# Patient Record
Sex: FEMALE | Race: OTHER | HISPANIC OR LATINO
[De-identification: names, ages, dates, MRNs, and addresses within clinical notes are randomized per-mention and may not be internally consistent; named-entity substitution may affect disease eponyms.]

---

## 2017-07-26 ENCOUNTER — APPOINTMENT (OUTPATIENT)
Dept: ANTEPARTUM | Facility: CLINIC | Age: 37
End: 2017-07-26

## 2017-07-26 ENCOUNTER — LABORATORY RESULT (OUTPATIENT)
Age: 37
End: 2017-07-26

## 2017-07-26 ENCOUNTER — ASOB RESULT (OUTPATIENT)
Age: 37
End: 2017-07-26

## 2017-09-15 ENCOUNTER — APPOINTMENT (OUTPATIENT)
Dept: ANTEPARTUM | Facility: CLINIC | Age: 37
End: 2017-09-15
Payer: COMMERCIAL

## 2017-09-15 ENCOUNTER — ASOB RESULT (OUTPATIENT)
Age: 37
End: 2017-09-15

## 2017-09-15 PROCEDURE — 76811 OB US DETAILED SNGL FETUS: CPT

## 2018-01-08 ENCOUNTER — ASOB RESULT (OUTPATIENT)
Age: 38
End: 2018-01-08

## 2018-01-08 ENCOUNTER — APPOINTMENT (OUTPATIENT)
Dept: ANTEPARTUM | Facility: CLINIC | Age: 38
End: 2018-01-08
Payer: COMMERCIAL

## 2018-01-08 PROCEDURE — 76816 OB US FOLLOW-UP PER FETUS: CPT

## 2018-01-26 ENCOUNTER — INPATIENT (INPATIENT)
Facility: HOSPITAL | Age: 38
LOS: 1 days | Discharge: HOME | End: 2018-01-28
Attending: OBSTETRICS & GYNECOLOGY | Admitting: OBSTETRICS & GYNECOLOGY
Payer: COMMERCIAL

## 2018-01-26 VITALS
TEMPERATURE: 99 F | SYSTOLIC BLOOD PRESSURE: 135 MMHG | OXYGEN SATURATION: 100 % | DIASTOLIC BLOOD PRESSURE: 65 MMHG | HEART RATE: 89 BPM | RESPIRATION RATE: 15 BRPM

## 2018-01-26 DIAGNOSIS — Z34.80 ENCOUNTER FOR SUPERVISION OF OTHER NORMAL PREGNANCY, UNSPECIFIED TRIMESTER: ICD-10-CM

## 2018-01-26 DIAGNOSIS — Z3A.00 WEEKS OF GESTATION OF PREGNANCY NOT SPECIFIED: ICD-10-CM

## 2018-01-26 DIAGNOSIS — O26.899 OTHER SPECIFIED PREGNANCY RELATED CONDITIONS, UNSPECIFIED TRIMESTER: ICD-10-CM

## 2018-01-26 LAB
BASOPHILS # BLD AUTO: 0.1 K/UL — SIGNIFICANT CHANGE UP (ref 0–0.2)
BASOPHILS NFR BLD AUTO: 0.4 % — SIGNIFICANT CHANGE UP (ref 0–2)
BLD GP AB SCN SERPL QL: NEGATIVE — SIGNIFICANT CHANGE UP
EOSINOPHIL # BLD AUTO: 0.1 K/UL — SIGNIFICANT CHANGE UP (ref 0–0.5)
EOSINOPHIL NFR BLD AUTO: 0.7 % — SIGNIFICANT CHANGE UP (ref 0–6)
HCT VFR BLD CALC: 28.9 % — LOW (ref 34.5–45)
HGB BLD-MCNC: 10.1 G/DL — LOW (ref 11.5–15.5)
LYMPHOCYTES # BLD AUTO: 22.8 % — SIGNIFICANT CHANGE UP (ref 13–44)
LYMPHOCYTES # BLD AUTO: 3.7 K/UL — HIGH (ref 1–3.3)
MCHC RBC-ENTMCNC: 26.7 PG — LOW (ref 27–34)
MCHC RBC-ENTMCNC: 34.9 GM/DL — SIGNIFICANT CHANGE UP (ref 32–36)
MCV RBC AUTO: 76.7 FL — LOW (ref 80–100)
MONOCYTES # BLD AUTO: 1.2 K/UL — HIGH (ref 0–0.9)
MONOCYTES NFR BLD AUTO: 7.6 % — SIGNIFICANT CHANGE UP (ref 2–14)
NEUTROPHILS # BLD AUTO: 11.2 K/UL — HIGH (ref 1.8–7.4)
NEUTROPHILS NFR BLD AUTO: 68.5 % — SIGNIFICANT CHANGE UP (ref 43–77)
PLATELET # BLD AUTO: 257 K/UL — SIGNIFICANT CHANGE UP (ref 150–400)
RBC # BLD: 3.77 M/UL — LOW (ref 3.8–5.2)
RBC # FLD: 15.6 % — HIGH (ref 10.3–14.5)
RH IG SCN BLD-IMP: POSITIVE — SIGNIFICANT CHANGE UP
T PALLIDUM AB TITR SER: NEGATIVE — SIGNIFICANT CHANGE UP
WBC # BLD: 16.4 K/UL — HIGH (ref 3.8–10.5)
WBC # FLD AUTO: 16.4 K/UL — HIGH (ref 3.8–10.5)

## 2018-01-26 RX ORDER — ACETAMINOPHEN 500 MG
975 TABLET ORAL EVERY 6 HOURS
Qty: 0 | Refills: 0 | Status: DISCONTINUED | OUTPATIENT
Start: 2018-01-26 | End: 2018-01-28

## 2018-01-26 RX ORDER — DIPHENHYDRAMINE HCL 50 MG
25 CAPSULE ORAL EVERY 6 HOURS
Qty: 0 | Refills: 0 | Status: DISCONTINUED | OUTPATIENT
Start: 2018-01-26 | End: 2018-01-28

## 2018-01-26 RX ORDER — SODIUM CHLORIDE 9 MG/ML
3 INJECTION INTRAMUSCULAR; INTRAVENOUS; SUBCUTANEOUS EVERY 8 HOURS
Qty: 0 | Refills: 0 | Status: DISCONTINUED | OUTPATIENT
Start: 2018-01-26 | End: 2018-01-28

## 2018-01-26 RX ORDER — MAGNESIUM HYDROXIDE 400 MG/1
30 TABLET, CHEWABLE ORAL
Qty: 0 | Refills: 0 | Status: DISCONTINUED | OUTPATIENT
Start: 2018-01-26 | End: 2018-01-28

## 2018-01-26 RX ORDER — HYDROCORTISONE 1 %
1 OINTMENT (GRAM) TOPICAL EVERY 4 HOURS
Qty: 0 | Refills: 0 | Status: DISCONTINUED | OUTPATIENT
Start: 2018-01-26 | End: 2018-01-28

## 2018-01-26 RX ORDER — SODIUM CHLORIDE 9 MG/ML
3 INJECTION INTRAMUSCULAR; INTRAVENOUS; SUBCUTANEOUS EVERY 8 HOURS
Qty: 0 | Refills: 0 | Status: DISCONTINUED | OUTPATIENT
Start: 2018-01-26 | End: 2018-01-26

## 2018-01-26 RX ORDER — PRAMOXINE HYDROCHLORIDE 150 MG/15G
1 AEROSOL, FOAM RECTAL EVERY 4 HOURS
Qty: 0 | Refills: 0 | Status: DISCONTINUED | OUTPATIENT
Start: 2018-01-26 | End: 2018-01-28

## 2018-01-26 RX ORDER — AER TRAVELER 0.5 G/1
1 SOLUTION RECTAL; TOPICAL EVERY 4 HOURS
Qty: 0 | Refills: 0 | Status: DISCONTINUED | OUTPATIENT
Start: 2018-01-26 | End: 2018-01-28

## 2018-01-26 RX ORDER — SIMETHICONE 80 MG/1
80 TABLET, CHEWABLE ORAL EVERY 6 HOURS
Qty: 0 | Refills: 0 | Status: DISCONTINUED | OUTPATIENT
Start: 2018-01-26 | End: 2018-01-28

## 2018-01-26 RX ORDER — DIBUCAINE 1 %
1 OINTMENT (GRAM) RECTAL EVERY 4 HOURS
Qty: 0 | Refills: 0 | Status: DISCONTINUED | OUTPATIENT
Start: 2018-01-26 | End: 2018-01-26

## 2018-01-26 RX ORDER — IBUPROFEN 200 MG
600 TABLET ORAL EVERY 6 HOURS
Qty: 0 | Refills: 0 | Status: DISCONTINUED | OUTPATIENT
Start: 2018-01-26 | End: 2018-01-28

## 2018-01-26 RX ORDER — SODIUM CHLORIDE 9 MG/ML
1000 INJECTION, SOLUTION INTRAVENOUS ONCE
Qty: 0 | Refills: 0 | Status: DISCONTINUED | OUTPATIENT
Start: 2018-01-26 | End: 2018-01-26

## 2018-01-26 RX ORDER — OXYCODONE HYDROCHLORIDE 5 MG/1
5 TABLET ORAL EVERY 4 HOURS
Qty: 0 | Refills: 0 | Status: DISCONTINUED | OUTPATIENT
Start: 2018-01-26 | End: 2018-01-28

## 2018-01-26 RX ORDER — IBUPROFEN 200 MG
600 TABLET ORAL EVERY 6 HOURS
Qty: 0 | Refills: 0 | Status: COMPLETED | OUTPATIENT
Start: 2018-01-26 | End: 2018-12-25

## 2018-01-26 RX ORDER — OXYTOCIN 10 UNIT/ML
41.67 VIAL (ML) INJECTION
Qty: 20 | Refills: 0 | Status: DISCONTINUED | OUTPATIENT
Start: 2018-01-26 | End: 2018-01-26

## 2018-01-26 RX ORDER — HYDROCORTISONE 1 %
1 OINTMENT (GRAM) TOPICAL EVERY 4 HOURS
Qty: 0 | Refills: 0 | Status: DISCONTINUED | OUTPATIENT
Start: 2018-01-26 | End: 2018-01-26

## 2018-01-26 RX ORDER — OXYCODONE HYDROCHLORIDE 5 MG/1
5 TABLET ORAL
Qty: 0 | Refills: 0 | Status: DISCONTINUED | OUTPATIENT
Start: 2018-01-26 | End: 2018-01-28

## 2018-01-26 RX ORDER — SODIUM CHLORIDE 9 MG/ML
1000 INJECTION, SOLUTION INTRAVENOUS
Qty: 0 | Refills: 0 | Status: DISCONTINUED | OUTPATIENT
Start: 2018-01-26 | End: 2018-01-26

## 2018-01-26 RX ORDER — DOCUSATE SODIUM 100 MG
100 CAPSULE ORAL
Qty: 0 | Refills: 0 | Status: DISCONTINUED | OUTPATIENT
Start: 2018-01-26 | End: 2018-01-28

## 2018-01-26 RX ORDER — DIBUCAINE 1 %
1 OINTMENT (GRAM) RECTAL EVERY 4 HOURS
Qty: 0 | Refills: 0 | Status: DISCONTINUED | OUTPATIENT
Start: 2018-01-26 | End: 2018-01-28

## 2018-01-26 RX ORDER — ACETAMINOPHEN 500 MG
975 TABLET ORAL EVERY 6 HOURS
Qty: 0 | Refills: 0 | Status: COMPLETED | OUTPATIENT
Start: 2018-01-26 | End: 2018-12-25

## 2018-01-26 RX ORDER — GLYCERIN ADULT
1 SUPPOSITORY, RECTAL RECTAL AT BEDTIME
Qty: 0 | Refills: 0 | Status: DISCONTINUED | OUTPATIENT
Start: 2018-01-26 | End: 2018-01-28

## 2018-01-26 RX ORDER — AER TRAVELER 0.5 G/1
1 SOLUTION RECTAL; TOPICAL EVERY 4 HOURS
Qty: 0 | Refills: 0 | Status: DISCONTINUED | OUTPATIENT
Start: 2018-01-26 | End: 2018-01-26

## 2018-01-26 RX ORDER — OXYTOCIN 10 UNIT/ML
333.33 VIAL (ML) INJECTION
Qty: 20 | Refills: 0 | Status: DISCONTINUED | OUTPATIENT
Start: 2018-01-26 | End: 2018-01-26

## 2018-01-26 RX ORDER — LANOLIN
1 OINTMENT (GRAM) TOPICAL EVERY 6 HOURS
Qty: 0 | Refills: 0 | Status: DISCONTINUED | OUTPATIENT
Start: 2018-01-26 | End: 2018-01-28

## 2018-01-26 RX ORDER — PRAMOXINE HYDROCHLORIDE 150 MG/15G
1 AEROSOL, FOAM RECTAL EVERY 4 HOURS
Qty: 0 | Refills: 0 | Status: DISCONTINUED | OUTPATIENT
Start: 2018-01-26 | End: 2018-01-26

## 2018-01-26 RX ORDER — CITRIC ACID/SODIUM CITRATE 300-500 MG
15 SOLUTION, ORAL ORAL EVERY 4 HOURS
Qty: 0 | Refills: 0 | Status: DISCONTINUED | OUTPATIENT
Start: 2018-01-26 | End: 2018-01-26

## 2018-01-26 RX ADMIN — SODIUM CHLORIDE 250 MILLILITER(S): 9 INJECTION, SOLUTION INTRAVENOUS at 11:01

## 2018-01-26 RX ADMIN — Medication 600 MILLIGRAM(S): at 21:14

## 2018-01-26 RX ADMIN — Medication 600 MILLIGRAM(S): at 21:50

## 2018-01-26 RX ADMIN — Medication 15 MILLILITER(S): at 11:23

## 2018-01-26 NOTE — PATIENT PROFILE OB - PRO FEEDING PLAN INFANT OB
[facility], [place] [address] [facility], [address] [address] [phone]               Thank you for choosing us for your health care visit with Xiomara Lyon MD.  We are glad to serve you and happy to provide you with this Assoc Dx:  CKD (chronic kidney disease), stage 4 (severe) (HCC) [N18.4], Anemia in chronic kidney disease [N18.9, D63.1], BEN (acute kidney injury) ([facility] [address] [V25.8], Metabolic acidosis [C45.6]                 Follow-up Instructions     Return in about 3 lois Summaries. If you've been to the Emergency Department or your doctor's office, you can view your past visit information in [employer] by going to Visits < Visit Summaries. [employer] questions? Call [phone] for help.   [employer] is NOT to be used for urge breastfeeding exclusively

## 2018-01-27 ENCOUNTER — TRANSCRIPTION ENCOUNTER (OUTPATIENT)
Age: 38
End: 2018-01-27

## 2018-01-27 LAB
HCT VFR BLD CALC: 27 % — LOW (ref 34.5–45)
HGB BLD-MCNC: 9.2 G/DL — LOW (ref 11.5–15.5)

## 2018-01-27 RX ORDER — IBUPROFEN 200 MG
1 TABLET ORAL
Qty: 0 | Refills: 0 | DISCHARGE
Start: 2018-01-27

## 2018-01-27 RX ORDER — FERROUS SULFATE 325(65) MG
325 TABLET ORAL
Qty: 0 | Refills: 0 | Status: DISCONTINUED | OUTPATIENT
Start: 2018-01-27 | End: 2018-01-28

## 2018-01-27 RX ORDER — DOCUSATE SODIUM 100 MG
1 CAPSULE ORAL
Qty: 0 | Refills: 0 | DISCHARGE
Start: 2018-01-27

## 2018-01-27 RX ORDER — ASCORBIC ACID 60 MG
1 TABLET,CHEWABLE ORAL
Qty: 0 | Refills: 0 | DISCHARGE
Start: 2018-01-27

## 2018-01-27 RX ORDER — LANOLIN
1 OINTMENT (GRAM) TOPICAL
Qty: 0 | Refills: 0 | DISCHARGE
Start: 2018-01-27

## 2018-01-27 RX ORDER — ACETAMINOPHEN 500 MG
3 TABLET ORAL
Qty: 0 | Refills: 0 | DISCHARGE
Start: 2018-01-27

## 2018-01-27 RX ORDER — DIBUCAINE 1 %
1 OINTMENT (GRAM) RECTAL
Qty: 0 | Refills: 0 | DISCHARGE
Start: 2018-01-27

## 2018-01-27 RX ORDER — ASCORBIC ACID 60 MG
250 TABLET,CHEWABLE ORAL THREE TIMES A DAY
Qty: 0 | Refills: 0 | Status: DISCONTINUED | OUTPATIENT
Start: 2018-01-27 | End: 2018-01-28

## 2018-01-27 RX ADMIN — Medication 325 MILLIGRAM(S): at 18:16

## 2018-01-27 RX ADMIN — Medication 250 MILLIGRAM(S): at 12:37

## 2018-01-27 RX ADMIN — Medication 250 MILLIGRAM(S): at 21:52

## 2018-01-27 RX ADMIN — Medication 325 MILLIGRAM(S): at 12:38

## 2018-01-27 RX ADMIN — Medication 600 MILLIGRAM(S): at 19:10

## 2018-01-27 RX ADMIN — Medication 600 MILLIGRAM(S): at 05:41

## 2018-01-27 RX ADMIN — Medication 600 MILLIGRAM(S): at 13:30

## 2018-01-27 RX ADMIN — Medication 1 TABLET(S): at 12:37

## 2018-01-27 RX ADMIN — Medication 600 MILLIGRAM(S): at 18:16

## 2018-01-27 RX ADMIN — Medication 600 MILLIGRAM(S): at 06:10

## 2018-01-27 RX ADMIN — Medication 600 MILLIGRAM(S): at 12:38

## 2018-01-27 RX ADMIN — Medication 975 MILLIGRAM(S): at 21:00

## 2018-01-27 RX ADMIN — Medication 975 MILLIGRAM(S): at 20:18

## 2018-01-27 RX ADMIN — Medication 100 MILLIGRAM(S): at 12:38

## 2018-01-27 NOTE — DISCHARGE NOTE OB - MEDICATION SUMMARY - MEDICATIONS TO TAKE
I will START or STAY ON the medications listed below when I get home from the hospital:    acetaminophen 325 mg oral tablet  -- 3 tab(s) by mouth every 6 hours  -- Indication: For Pain    ibuprofen 600 mg oral tablet  -- 1 tab(s) by mouth every 6 hours  -- Indication: For Pain    dibucaine 1% topical ointment  -- 1 application on skin every 4 hours, As needed, Perineal Discomfort  -- Indication: For Pain    lanolin topical ointment  -- 1 application on skin every 6 hours, As needed, Sore Nipples  -- Indication: For Breast care    Prenatal Multivitamins with Folic Acid 1 mg oral tablet  -- 1 tab(s) by mouth once a day  -- Indication: For Health    docusate sodium 100 mg oral capsule  -- 1 cap(s) by mouth 2 times a day, As needed, Stool Softening  -- Indication: For Stool softener    ascorbic acid 250 mg oral tablet  -- 1 tab(s) by mouth 3 times a day  -- Indication: For anemia

## 2018-01-27 NOTE — PROGRESS NOTE ADULT - SUBJECTIVE AND OBJECTIVE BOX
Postpartum Note- PPD#1    Allergies    No Known Allergies    Intolerances      Blood Type: O   Positive    RPR : Negative    Rubella:  Immune      S: Patient is a  38yo P2  PPD#1         S/P    Patient w/o complaints, pain is controlled.    Pt is OOB, tolerating PO, passing flatus. Lochia WNL.     Feeding: Breast    O:  Vital Signs Last 24 Hrs  T(C): 36.6 (2018 06:00), Max: 37.3 (2018 13:15)  T(F): 97.9 (2018 06:00), Max: 99.1 (2018 13:15)  HR: 72 (2018 06:00) (72 - 98)  BP: 120/78 (2018 06:00) (116/74 - 135/65)  RR: 18 (2018 06:00) (15 - 18)  SpO2: 100% (2018 17:10) (98% - 100%)     Gen: NAD  Abdomen: Soft, nontender, non-distended, fundus firm.  Vaginal: Lochia WNL  Ext: Neg edema, Neg calf tenderness    LABS:    Hemoglobin: 9.2 g/dL ( @ 07:09)  Hemoglobin: 10.1 g/dL ( @ 09:35)      Hematocrit: 27.0 % ( @ 07:09)  Hematocrit: 28.9 % ( @ 09:35)      PMHx: none  Current Issues: none

## 2018-01-27 NOTE — DISCHARGE NOTE OB - CARE PROVIDER_API CALL
Shilpa Best), Obstetrics and Gynecology  [address] [place] [address]  Phone: [phone]  Fax: [phone]

## 2018-01-27 NOTE — DISCHARGE NOTE OB - HOSPITAL COURSE
Pt underwent normal vaginal delivery. Uncomplicated postpartum course. Pt met all milestones in regards to postop labs, activities, diet and pain control. Pt was discharged on PPD #2 in stable condition. All follow up and instructions discussed.

## 2018-01-27 NOTE — DISCHARGE NOTE OB - PATIENT PORTAL LINK FT
“You can access the FollowHealth Patient Portal, offered by [facility], by registering with the following website: [email or web address]”

## 2018-01-27 NOTE — DISCHARGE NOTE OB - CARE PLAN
Principal Discharge DX:	Vaginal delivery  Goal:	Recovery  Assessment and plan of treatment:	See below.

## 2018-01-27 NOTE — CHART NOTE - NSCHARTNOTEFT_GEN_A_CORE
PA NOTE     Day 1        Vital Signs Last 24 Hrs  T(C): 36.6 (2018 06:00), Max: 37.3 (2018 13:15)  T(F): 97.9 (2018 06:00), Max: 99.1 (2018 13:15)  HR: 72 (2018 06:00) (72 - 98)  BP: 120/78 (2018 06:00) (116/74 - 135/65)  RR: 18 (2018 06:00) (15 - 18)  SpO2: 100% (2018 17:10) (98% - 100%)                          9.2    x     )-----------( x        ( 2018 07:09 )             27.0           Plan:  - Ferrous Sulfate, Colace, Vitamin C supplementation.  - Monitor for signs/symptoms of anemia.     Jackie Hodge PA-C

## 2018-01-27 NOTE — PROGRESS NOTE ADULT - SUBJECTIVE AND OBJECTIVE BOX
Post-Anesthetic Evaluation:    The Patient was interviewed and evaluated    Vital Signs Last 24 Hrs  T(C): 36.6 (27 Jan 2018 06:00), Max: 36.8 (26 Jan 2018 15:15)  T(F): 97.9 (27 Jan 2018 06:00), Max: 98.2 (26 Jan 2018 15:15)  HR: 72 (27 Jan 2018 06:00) (72 - 78)  BP: 120/78 (27 Jan 2018 06:00) (119/75 - 126/69)  BP(mean): --  RR: 18 (27 Jan 2018 06:00) (18 - 18)  SpO2: 100% (26 Jan 2018 17:10) (99% - 100%)    Evaluation:      (X) No apparent complications or complaints regarding anesthesia care at this time  (X) All questions were answered    Condition:  (X) Stable      ( ) Guarded      ( ) Critical    Recommendations:  (X) None     ( ) Other:

## 2018-01-28 VITALS
DIASTOLIC BLOOD PRESSURE: 76 MMHG | RESPIRATION RATE: 18 BRPM | SYSTOLIC BLOOD PRESSURE: 113 MMHG | HEART RATE: 69 BPM | TEMPERATURE: 98 F | OXYGEN SATURATION: 98 %

## 2018-01-28 PROCEDURE — 86850 RBC ANTIBODY SCREEN: CPT

## 2018-01-28 PROCEDURE — G0463: CPT

## 2018-01-28 PROCEDURE — 59050 FETAL MONITOR W/REPORT: CPT

## 2018-01-28 PROCEDURE — 85027 COMPLETE CBC AUTOMATED: CPT

## 2018-01-28 PROCEDURE — 86900 BLOOD TYPING SEROLOGIC ABO: CPT

## 2018-01-28 PROCEDURE — 86780 TREPONEMA PALLIDUM: CPT

## 2018-01-28 PROCEDURE — 59025 FETAL NON-STRESS TEST: CPT

## 2018-01-28 PROCEDURE — 86901 BLOOD TYPING SEROLOGIC RH(D): CPT

## 2018-01-28 PROCEDURE — 85018 HEMOGLOBIN: CPT

## 2018-01-28 RX ADMIN — Medication 250 MILLIGRAM(S): at 05:54

## 2018-01-28 RX ADMIN — Medication 600 MILLIGRAM(S): at 05:54

## 2018-01-28 NOTE — PROGRESS NOTE ADULT - SUBJECTIVE AND OBJECTIVE BOX
After Visit Summary   5/17/2017    Tosin Hawkins    MRN: [id number]           Patient Information     Date Of Birth          1960        Visit Information        Provider Department      5/17/2017 9:00 AM RH INFUSION CHAIR 8 [facility]        Today's Diagnoses     Iron deficiency anemia secondary to blood loss (chronic)    -  1    Menorrhagia        Fibroid, uterine           Follow-ups after your visit        Your next 10 appointments already scheduled     May 19, 2017  8:00 AM CDT   Level 2 with RH INFUSION CHAIR 5   [facility] ([facility])    [facility]  [address]  [facility] [id number] [phone]            May 22, 2017 11:30 AM CDT   Level 2 with RH INFUSION CHAIR 11   [facility] ([facility])    [facility]  [address]  [facility] [id number] [phone]              Who to contact     If you have questions or need follow up information about today's clinic visit or your schedule please contact [facility] directly at [phone].  Normal or non-critical lab and imaging results will be communicated to you by MyChart, letter or phone within 4 business days after the clinic has received the results. If you do not hear from us within 7 days, please contact the clinic through MyChart or phone. If you have a critical or abnormal lab result, we will notify you by phone as soon as possible.  Submit refill requests through [employer] or call your pharmacy and they will forward the refill request to us. Please allow 3 business days for your refill to be completed.          Additional Information About Your Visit        MyChart Information     [employer] lets you send messages to your doctor, view your test results, renew your prescriptions, schedule  "appointments and more. To sign up, go to [email or web address] . Click on \"Log in\" on the left side of the screen, which will take you to the Welcome page. Then click on \"Sign up Now\" on the right side of the page.     You will be asked to enter the access code listed below, as well as some personal information. Please follow the directions to create your username and password.     Your access code is: [email or web address]  Expires: 7/10/2017  2:04 PM     Your access code will  in 90 days. If you need help or a new code, please call your [facility] clinic or [phone].        Care EveryWhere ID     This is your Care EveryWhere ID. This could be used by other organizations to access your [facility] medical records  [id number]        Your Vitals Were     Pulse Temperature Respirations Last Period Pulse Oximetry       72 98.3  F (36.8  C) (Tympanic) 16 2017 100%        Blood Pressure from Last 3 Encounters:   17 114/50   17 103/53   17 118/72    Weight from Last 3 Encounters:   17 91.6 kg (202 lb)   17 93.4 kg (205 lb 12.8 oz)   16 92.5 kg (203 lb 14.4 oz)              Today, you had the following     No orders found for display       Primary Care Provider    Physician No Ref-Primary       No address on file        Thank you!     Thank you for choosing [facility]  for your care. Our goal is always to provide you with excellent care. Hearing back from our patients is one way we can continue to improve our services. Please take a few minutes to complete the written survey that you may receive in the mail after your visit with us. Thank you!             Your Updated Medication List - Protect others around you: Learn how to safely use, store and throw away your medicines at www.disposemymeds.org.          This list is accurate as of: 17 11:01 AM.  Always use your most recent med list.                   Brand Name Dispense Instructions for " S: Patient doing well. Minimal lochia. Pain controlled.  PAST MEDICAL & SURGICAL HISTORY:    O: Vital Signs Last 24 Hrs  T(C): 36.8 (2018 18:00), Max: 36.8 (2018 18:00)  T(F): 98.3 (2018 18:00), Max: 98.3 (2018 18:00)  HR: 96 (2018 18:00) (96 - 96)  BP: 125/81 (2018 18:00) (125/81 - 125/81)  BP(mean): --  RR: 18 (2018 18:00) (18 - 18)  SpO2: --    Gen: NAD  Abd: soft, NT, ND, fundus firm below umbilicus  Lochia: moderate  Ext: no tenderness    Labs:                        9.2    x     )-----------( x        ( 2018 07:09 )             27.0       A: 37y PPD# s/p  doing well.    Plan: use    cyanocobalamin 1000 MCG tablet    vitamin  B-12     Take by mouth daily       IRON CR PO      qd, unsure on dose       magnesium 250 MG tablet      Take 1 tablet by mouth daily

## 2018-01-28 NOTE — PROGRESS NOTE ADULT - ASSESSMENT
Pt seen and examined.  agree with note above.  pt doing well  routine PP care.  dc instructions given

## 2018-02-28 NOTE — PATIENT PROFILE OB - BREAST MILK SUPPORTS STABLE NEWBORN BLOOD SUGAR
Chemodenervation  Date/Time: 2/28/2018 8:45 AM  Performed by: Debbie Lopes by: Mona Shell details:     Prepped With: Alcohol    Anesthesia (see MAR for exact dosages): Anesthesia method:  None  Procedure details:     Position:  Upright  Botox:     Botox Type:  Type A    Brand:  Botox    mL's of Botulinum Toxin:  155    Final Concentration per CC:  200 units    Needle Gauge:  30 G 2 5 inch    Medication Administration:  100 Units onabotulinumtoxin A 100 units  Procedures:     Botox Procedures: chronic headache      Indications: migraines    Injection Location:     Head / Face:  L superior cervical paraspinal, R superior cervical paraspinal, L , R , L frontalis, R frontalis, L medial occipitalis, R medial occipitalis, procerus, R temporalis, L temporalis, L superior trapezius and R superior trapezius    L  injection amount:  5 unit(s)    R  injection amount:  5 unit(s)    L lateral frontalis:  5 unit(s)    R lateral frontalis:  5 unit(s)    L medial frontalis:  5 unit(s)    R medial frontalis:  5 unit(s)    L temporalis injection amount:  20 unit(s)    R temporalis injection amount:  20 unit(s)    Procerus injection amount:  5 unit(s)    L medial occipitalis injection amount:  15 unit(s)    R medial occipitalis injection amount:  15 unit(s)    L superior cervical paraspinal injection amount:  10 unit(s)    R superior cervical paraspinal injection amount:  10 unit(s)    L superior trapezius injection amount:  15 unit(s)    R superior trapezius injection amount:  15 unit(s)  Total Units:     Total units used:  155    Total units discarded:  45  Post-procedure details:     Chemodenervation:  Chronic migraine    Patient tolerance of procedure:   Tolerated well, no immediate complications Statement Selected

## 2019-12-16 ENCOUNTER — OUTPATIENT (OUTPATIENT)
Dept: OUTPATIENT SERVICES | Facility: HOSPITAL | Age: 39
LOS: 1 days | End: 2019-12-16
Payer: COMMERCIAL

## 2019-12-16 ENCOUNTER — APPOINTMENT (OUTPATIENT)
Dept: CT IMAGING | Facility: CLINIC | Age: 39
End: 2019-12-16
Payer: COMMERCIAL

## 2019-12-16 DIAGNOSIS — Z00.8 ENCOUNTER FOR OTHER GENERAL EXAMINATION: ICD-10-CM

## 2019-12-16 PROCEDURE — 74174 CTA ABD&PLVS W/CONTRAST: CPT | Mod: 26

## 2019-12-16 PROCEDURE — 74174 CTA ABD&PLVS W/CONTRAST: CPT

## 2020-01-06 ENCOUNTER — OUTPATIENT (OUTPATIENT)
Dept: OUTPATIENT SERVICES | Facility: HOSPITAL | Age: 40
LOS: 1 days | End: 2020-01-06
Payer: COMMERCIAL

## 2020-01-06 VITALS
RESPIRATION RATE: 14 BRPM | DIASTOLIC BLOOD PRESSURE: 89 MMHG | WEIGHT: 175.05 LBS | HEART RATE: 65 BPM | HEIGHT: 62 IN | SYSTOLIC BLOOD PRESSURE: 144 MMHG | TEMPERATURE: 98 F | OXYGEN SATURATION: 98 %

## 2020-01-06 DIAGNOSIS — Z01.818 ENCOUNTER FOR OTHER PREPROCEDURAL EXAMINATION: ICD-10-CM

## 2020-01-06 DIAGNOSIS — Z85.3 PERSONAL HISTORY OF MALIGNANT NEOPLASM OF BREAST: ICD-10-CM

## 2020-01-06 DIAGNOSIS — Z90.13 ACQUIRED ABSENCE OF BILATERAL BREASTS AND NIPPLES: ICD-10-CM

## 2020-01-06 DIAGNOSIS — C50.919 MALIGNANT NEOPLASM OF UNSPECIFIED SITE OF UNSPECIFIED FEMALE BREAST: ICD-10-CM

## 2020-01-06 DIAGNOSIS — Z29.9 ENCOUNTER FOR PROPHYLACTIC MEASURES, UNSPECIFIED: ICD-10-CM

## 2020-01-06 LAB
ANION GAP SERPL CALC-SCNC: 16 MMOL/L — SIGNIFICANT CHANGE UP (ref 5–17)
BLD GP AB SCN SERPL QL: NEGATIVE — SIGNIFICANT CHANGE UP
BUN SERPL-MCNC: 13 MG/DL — SIGNIFICANT CHANGE UP (ref 7–23)
CALCIUM SERPL-MCNC: 9.5 MG/DL — SIGNIFICANT CHANGE UP (ref 8.4–10.5)
CHLORIDE SERPL-SCNC: 100 MMOL/L — SIGNIFICANT CHANGE UP (ref 96–108)
CO2 SERPL-SCNC: 25 MMOL/L — SIGNIFICANT CHANGE UP (ref 22–31)
CREAT SERPL-MCNC: 0.68 MG/DL — SIGNIFICANT CHANGE UP (ref 0.5–1.3)
GLUCOSE SERPL-MCNC: 100 MG/DL — HIGH (ref 70–99)
HCT VFR BLD CALC: 39.8 % — SIGNIFICANT CHANGE UP (ref 34.5–45)
HGB BLD-MCNC: 13.1 G/DL — SIGNIFICANT CHANGE UP (ref 11.5–15.5)
MCHC RBC-ENTMCNC: 29 PG — SIGNIFICANT CHANGE UP (ref 27–34)
MCHC RBC-ENTMCNC: 32.9 GM/DL — SIGNIFICANT CHANGE UP (ref 32–36)
MCV RBC AUTO: 88.1 FL — SIGNIFICANT CHANGE UP (ref 80–100)
PLATELET # BLD AUTO: 287 K/UL — SIGNIFICANT CHANGE UP (ref 150–400)
POTASSIUM SERPL-MCNC: 4.1 MMOL/L — SIGNIFICANT CHANGE UP (ref 3.5–5.3)
POTASSIUM SERPL-SCNC: 4.1 MMOL/L — SIGNIFICANT CHANGE UP (ref 3.5–5.3)
RBC # BLD: 4.52 M/UL — SIGNIFICANT CHANGE UP (ref 3.8–5.2)
RBC # FLD: 12.8 % — SIGNIFICANT CHANGE UP (ref 10.3–14.5)
RH IG SCN BLD-IMP: POSITIVE — SIGNIFICANT CHANGE UP
SODIUM SERPL-SCNC: 141 MMOL/L — SIGNIFICANT CHANGE UP (ref 135–145)
WBC # BLD: 9.43 K/UL — SIGNIFICANT CHANGE UP (ref 3.8–10.5)
WBC # FLD AUTO: 9.43 K/UL — SIGNIFICANT CHANGE UP (ref 3.8–10.5)

## 2020-01-06 PROCEDURE — 85027 COMPLETE CBC AUTOMATED: CPT

## 2020-01-06 PROCEDURE — 86850 RBC ANTIBODY SCREEN: CPT

## 2020-01-06 PROCEDURE — G0463: CPT

## 2020-01-06 PROCEDURE — 80048 BASIC METABOLIC PNL TOTAL CA: CPT

## 2020-01-06 PROCEDURE — 86900 BLOOD TYPING SEROLOGIC ABO: CPT

## 2020-01-06 PROCEDURE — 86901 BLOOD TYPING SEROLOGIC RH(D): CPT

## 2020-01-06 RX ORDER — CEFAZOLIN SODIUM 1 G
2000 VIAL (EA) INJECTION ONCE
Refills: 0 | Status: COMPLETED | OUTPATIENT
Start: 2020-01-17 | End: 2020-01-17

## 2020-01-06 NOTE — H&P PST ADULT - NSICDXPASTMEDICALHX_GEN_ALL_CORE_FT
PAST MEDICAL HISTORY:  BRCA1 gene mutation negative     BRCA2 gene mutation negative     Breast cancer

## 2020-01-06 NOTE — H&P PST ADULT - ASSESSMENT
CAPRINI SCORE [CLOT updated 18]    AGE RELATED RISK FACTORS                                                       MOBILITY RELATED FACTORS  [ ] Age 41-60 years                                            (1 Point)                    [ ] Bed rest                                                        (1 Point)  [ ] Age: 61-74 years                                           (2 Points)                  [ ] Plaster cast                                                   (2 Points)  [ ] Age= 75 years                                              (3 Points)                    [ ] Bed bound for more than 72 hours                 (2 Points)    DISEASE RELATED RISK FACTORS                                               GENDER SPECIFIC FACTORS  [ ] Edema in the lower extremities                       (1 Point)              [ ] Pregnancy                                                     (1 Point)  [ ] Varicose veins                                               (1 Point)                     [ ] Post-partum < 6 weeks                                   (1 Point)             [ ] BMI > 25 Kg/m2                                            (1 Point)                     [ ] Hormonal therapy  or oral contraception          (1 Point)                 [ ] Sepsis (in the previous month)                        (1 Point)               [ ] History of pregnancy complications                 (1 point)  [ ] Pneumonia or serious lung disease                                               [ ] Unexplained or recurrent                     (1 Point)           (in the previous month)                               (1 Point)  [ ] Abnormal pulmonary function test                     (1 Point)                 SURGERY RELATED RISK FACTORS  [ ] Acute myocardial infarction                              (1 Point)               [ ]  Section                                             (1 Point)  [ ] Congestive heart failure (in the previous month)  (1 Point)      [ ] Minor surgery                                                  (1 Point)   [ ] Inflammatory bowel disease                             (1 Point)               [ ] Arthroscopic surgery                                        (2 Points)  [ ] Central venous access                                      (2 Points)                [ ] General surgery lasting more than 45 minutes (2 points)  [ ] Present or previous malignancy                     (2 Points)                [ ] Elective arthroplasty                                         (5 points)    [ ] Stroke (in the previous month)                          (5 Points)                                                                                                                                                           HEMATOLOGY RELATED FACTORS                                                 TRAUMA RELATED RISK FACTORS  [ ] Prior episodes of VTE                                     (3 Points)                [ ] Fracture of the hip, pelvis, or leg                       (5 Points)  [ ] Positive family history for VTE                         (3 Points)             [ ] Acute spinal cord injury (in the previous month)  (5 Points)  [ ] Prothrombin 33260 A                                     (3 Points)               [ ] Paralysis  (less than 1 month)                             (5 Points)  [ ] Factor V Leiden                                             (3 Points)                  [ ] Multiple Trauma within 1 month                        (5 Points)  [ ] Lupus anticoagulants                                     (3 Points)                                                           [ ] Anticardiolipin antibodies                               (3 Points)                                                       [ ] High homocysteine in the blood                      (3 Points)                                             [ ] Other congenital or acquired thrombophilia      (3 Points)                                                [ ] Heparin induced thrombocytopenia                  (3 Points)                                     Total Score [    5      ]

## 2020-01-06 NOTE — H&P PST ADULT - NSICDXPROBLEM_GEN_ALL_CORE_FT
PROBLEM DIAGNOSES  Problem: Breast cancer  Assessment and Plan: Scheduled bilateral mastectomy with reconstruction  urine preg DOS      Problem: Need for prophylactic measure  Assessment and Plan: The Caprini score indicates this patient is at risk for a VTE event (score 3-5).  Most surgical patients in this group would benefit from pharmacologic prophylaxis.  The surgical team will determine the balance between VTE risk and bleeding risk

## 2020-01-06 NOTE — H&P PST ADULT - HISTORY OF PRESENT ILLNESS
38 y/o female, , s/p Mirena placement in 2018, with newly diagnosed left breast CA found on routine mammo. Pt states she also noticed bloody discharge from her left nipple. Presents for bilateral mastectomy with DEIP flap reconstruction.

## 2020-01-06 NOTE — H&P PST ADULT - NEGATIVE BREAST SYMPTOMS
no breast lump L/no nipple discharge R/no breast tenderness L/no breast tenderness R/no breast lump R

## 2020-01-07 ENCOUNTER — OUTPATIENT (OUTPATIENT)
Dept: OUTPATIENT SERVICES | Facility: HOSPITAL | Age: 40
LOS: 1 days | End: 2020-01-07
Payer: COMMERCIAL

## 2020-01-07 ENCOUNTER — RESULT REVIEW (OUTPATIENT)
Age: 40
End: 2020-01-07

## 2020-01-07 DIAGNOSIS — C50.919 MALIGNANT NEOPLASM OF UNSPECIFIED SITE OF UNSPECIFIED FEMALE BREAST: ICD-10-CM

## 2020-01-07 PROCEDURE — 88321 CONSLTJ&REPRT SLD PREP ELSWR: CPT

## 2020-01-08 LAB — SURGICAL PATHOLOGY STUDY: SIGNIFICANT CHANGE UP

## 2020-01-16 ENCOUNTER — TRANSCRIPTION ENCOUNTER (OUTPATIENT)
Age: 40
End: 2020-01-16

## 2020-01-17 ENCOUNTER — RESULT REVIEW (OUTPATIENT)
Age: 40
End: 2020-01-17

## 2020-01-17 ENCOUNTER — INPATIENT (INPATIENT)
Facility: HOSPITAL | Age: 40
LOS: 2 days | Discharge: HOME | DRG: 580 | End: 2020-01-20
Attending: PLASTIC SURGERY | Admitting: PLASTIC SURGERY
Payer: COMMERCIAL

## 2020-01-17 ENCOUNTER — APPOINTMENT (OUTPATIENT)
Dept: NUCLEAR MEDICINE | Facility: HOSPITAL | Age: 40
End: 2020-01-17

## 2020-01-17 VITALS
RESPIRATION RATE: 16 BRPM | TEMPERATURE: 99 F | HEART RATE: 64 BPM | WEIGHT: 175.05 LBS | HEIGHT: 62 IN | DIASTOLIC BLOOD PRESSURE: 76 MMHG | OXYGEN SATURATION: 100 % | SYSTOLIC BLOOD PRESSURE: 128 MMHG

## 2020-01-17 DIAGNOSIS — Z85.3 PERSONAL HISTORY OF MALIGNANT NEOPLASM OF BREAST: ICD-10-CM

## 2020-01-17 DIAGNOSIS — Z90.13 ACQUIRED ABSENCE OF BILATERAL BREASTS AND NIPPLES: ICD-10-CM

## 2020-01-17 LAB
ANION GAP SERPL CALC-SCNC: 17 MMOL/L — SIGNIFICANT CHANGE UP (ref 5–17)
BUN SERPL-MCNC: 10 MG/DL — SIGNIFICANT CHANGE UP (ref 7–23)
CALCIUM SERPL-MCNC: 7 MG/DL — LOW (ref 8.4–10.5)
CHLORIDE SERPL-SCNC: 105 MMOL/L — SIGNIFICANT CHANGE UP (ref 96–108)
CO2 SERPL-SCNC: 18 MMOL/L — LOW (ref 22–31)
CREAT SERPL-MCNC: 0.67 MG/DL — SIGNIFICANT CHANGE UP (ref 0.5–1.3)
GLUCOSE SERPL-MCNC: 170 MG/DL — HIGH (ref 70–99)
HCG UR QL: NEGATIVE — SIGNIFICANT CHANGE UP
HCT VFR BLD CALC: 23.1 % — LOW (ref 34.5–45)
HGB BLD-MCNC: 7.6 G/DL — LOW (ref 11.5–15.5)
MCHC RBC-ENTMCNC: 29.1 PG — SIGNIFICANT CHANGE UP (ref 27–34)
MCHC RBC-ENTMCNC: 32.9 GM/DL — SIGNIFICANT CHANGE UP (ref 32–36)
MCV RBC AUTO: 88.5 FL — SIGNIFICANT CHANGE UP (ref 80–100)
NRBC # BLD: 0 /100 WBCS — SIGNIFICANT CHANGE UP (ref 0–0)
PLATELET # BLD AUTO: 161 K/UL — SIGNIFICANT CHANGE UP (ref 150–400)
POTASSIUM SERPL-MCNC: 3.9 MMOL/L — SIGNIFICANT CHANGE UP (ref 3.5–5.3)
POTASSIUM SERPL-SCNC: 3.9 MMOL/L — SIGNIFICANT CHANGE UP (ref 3.5–5.3)
RBC # BLD: 2.61 M/UL — LOW (ref 3.8–5.2)
RBC # FLD: 12.9 % — SIGNIFICANT CHANGE UP (ref 10.3–14.5)
SODIUM SERPL-SCNC: 140 MMOL/L — SIGNIFICANT CHANGE UP (ref 135–145)
WBC # BLD: 15.93 K/UL — HIGH (ref 3.8–10.5)
WBC # FLD AUTO: 15.93 K/UL — HIGH (ref 3.8–10.5)

## 2020-01-17 PROCEDURE — 88360 TUMOR IMMUNOHISTOCHEM/MANUAL: CPT | Mod: 26

## 2020-01-17 PROCEDURE — 88307 TISSUE EXAM BY PATHOLOGIST: CPT | Mod: 26

## 2020-01-17 PROCEDURE — 88331 PATH CONSLTJ SURG 1 BLK 1SPC: CPT | Mod: 26

## 2020-01-17 PROCEDURE — 88300 SURGICAL PATH GROSS: CPT | Mod: 26,59

## 2020-01-17 PROCEDURE — 88305 TISSUE EXAM BY PATHOLOGIST: CPT | Mod: 26

## 2020-01-17 RX ORDER — SODIUM CHLORIDE 9 MG/ML
1000 INJECTION, SOLUTION INTRAVENOUS
Refills: 0 | Status: DISCONTINUED | OUTPATIENT
Start: 2020-01-17 | End: 2020-01-19

## 2020-01-17 RX ORDER — CALCIUM CARBONATE 500(1250)
1 TABLET ORAL EVERY 6 HOURS
Refills: 0 | Status: DISCONTINUED | OUTPATIENT
Start: 2020-01-17 | End: 2020-01-20

## 2020-01-17 RX ORDER — HYDROMORPHONE HYDROCHLORIDE 2 MG/ML
1 INJECTION INTRAMUSCULAR; INTRAVENOUS; SUBCUTANEOUS EVERY 4 HOURS
Refills: 0 | Status: DISCONTINUED | OUTPATIENT
Start: 2020-01-17 | End: 2020-01-19

## 2020-01-17 RX ORDER — ACETAMINOPHEN 500 MG
975 TABLET ORAL EVERY 8 HOURS
Refills: 0 | Status: DISCONTINUED | OUTPATIENT
Start: 2020-01-18 | End: 2020-01-20

## 2020-01-17 RX ORDER — SODIUM CHLORIDE 9 MG/ML
3 INJECTION INTRAMUSCULAR; INTRAVENOUS; SUBCUTANEOUS EVERY 8 HOURS
Refills: 0 | Status: DISCONTINUED | OUTPATIENT
Start: 2020-01-17 | End: 2020-01-17

## 2020-01-17 RX ORDER — SENNA PLUS 8.6 MG/1
2 TABLET ORAL AT BEDTIME
Refills: 0 | Status: DISCONTINUED | OUTPATIENT
Start: 2020-01-17 | End: 2020-01-20

## 2020-01-17 RX ORDER — METOCLOPRAMIDE HCL 10 MG
10 TABLET ORAL ONCE
Refills: 0 | Status: DISCONTINUED | OUTPATIENT
Start: 2020-01-17 | End: 2020-01-19

## 2020-01-17 RX ORDER — LIDOCAINE HCL 20 MG/ML
0.2 VIAL (ML) INJECTION ONCE
Refills: 0 | Status: DISCONTINUED | OUTPATIENT
Start: 2020-01-17 | End: 2020-01-17

## 2020-01-17 RX ORDER — ACETAMINOPHEN 500 MG
1000 TABLET ORAL ONCE
Refills: 0 | Status: COMPLETED | OUTPATIENT
Start: 2020-01-18 | End: 2020-01-18

## 2020-01-17 RX ORDER — DIPHENHYDRAMINE HCL 50 MG
25 CAPSULE ORAL EVERY 6 HOURS
Refills: 0 | Status: DISCONTINUED | OUTPATIENT
Start: 2020-01-17 | End: 2020-01-20

## 2020-01-17 RX ORDER — ONDANSETRON 8 MG/1
4 TABLET, FILM COATED ORAL EVERY 6 HOURS
Refills: 0 | Status: DISCONTINUED | OUTPATIENT
Start: 2020-01-17 | End: 2020-01-20

## 2020-01-17 RX ORDER — OXYCODONE HYDROCHLORIDE 5 MG/1
5 TABLET ORAL EVERY 4 HOURS
Refills: 0 | Status: DISCONTINUED | OUTPATIENT
Start: 2020-01-17 | End: 2020-01-19

## 2020-01-17 RX ORDER — HEPARIN SODIUM 5000 [USP'U]/ML
5000 INJECTION INTRAVENOUS; SUBCUTANEOUS EVERY 12 HOURS
Refills: 0 | Status: DISCONTINUED | OUTPATIENT
Start: 2020-01-17 | End: 2020-01-20

## 2020-01-17 RX ORDER — HYDROMORPHONE HYDROCHLORIDE 2 MG/ML
0.5 INJECTION INTRAMUSCULAR; INTRAVENOUS; SUBCUTANEOUS
Refills: 0 | Status: DISCONTINUED | OUTPATIENT
Start: 2020-01-17 | End: 2020-01-19

## 2020-01-17 RX ORDER — BENZOCAINE AND MENTHOL 5; 1 G/100ML; G/100ML
1 LIQUID ORAL EVERY 4 HOURS
Refills: 0 | Status: DISCONTINUED | OUTPATIENT
Start: 2020-01-17 | End: 2020-01-20

## 2020-01-17 RX ORDER — ASPIRIN/CALCIUM CARB/MAGNESIUM 324 MG
81 TABLET ORAL DAILY
Refills: 0 | Status: DISCONTINUED | OUTPATIENT
Start: 2020-01-18 | End: 2020-01-20

## 2020-01-17 RX ORDER — CHLORHEXIDINE GLUCONATE 213 G/1000ML
1 SOLUTION TOPICAL ONCE
Refills: 0 | Status: DISCONTINUED | OUTPATIENT
Start: 2020-01-17 | End: 2020-01-17

## 2020-01-17 RX ORDER — CEFAZOLIN SODIUM 1 G
2000 VIAL (EA) INJECTION EVERY 8 HOURS
Refills: 0 | Status: COMPLETED | OUTPATIENT
Start: 2020-01-17 | End: 2020-01-18

## 2020-01-17 RX ADMIN — Medication 100 MILLIGRAM(S): at 21:25

## 2020-01-17 RX ADMIN — HYDROMORPHONE HYDROCHLORIDE 0.5 MILLIGRAM(S): 2 INJECTION INTRAMUSCULAR; INTRAVENOUS; SUBCUTANEOUS at 19:41

## 2020-01-17 RX ADMIN — HYDROMORPHONE HYDROCHLORIDE 0.5 MILLIGRAM(S): 2 INJECTION INTRAMUSCULAR; INTRAVENOUS; SUBCUTANEOUS at 21:00

## 2020-01-17 RX ADMIN — HYDROMORPHONE HYDROCHLORIDE 0.5 MILLIGRAM(S): 2 INJECTION INTRAMUSCULAR; INTRAVENOUS; SUBCUTANEOUS at 20:13

## 2020-01-17 RX ADMIN — HEPARIN SODIUM 5000 UNIT(S): 5000 INJECTION INTRAVENOUS; SUBCUTANEOUS at 20:00

## 2020-01-17 RX ADMIN — SODIUM CHLORIDE 125 MILLILITER(S): 9 INJECTION, SOLUTION INTRAVENOUS at 21:25

## 2020-01-17 RX ADMIN — HYDROMORPHONE HYDROCHLORIDE 0.5 MILLIGRAM(S): 2 INJECTION INTRAMUSCULAR; INTRAVENOUS; SUBCUTANEOUS at 20:45

## 2020-01-17 NOTE — CHART NOTE - NSCHARTNOTEFT_GEN_A_CORE
STATUS POST:      SUBJECTIVE: Pt seen in PACU 4 hours s/p Breast reconstruction with CASSIE free flap. Pt tolerated procedure well. Pt given 1U pRBCs due to blood loss intraoperatively. Pt has A-line, feliz catheter in place and is being given 4L O2 via nasal cannula. She's resting comfortably in bed and pain is well-controlled with dilaudid. She will be in the PACU overnight.   SOB:  [ ] YES [ X] NO  Chest Discomfort: [ ] YES [ X] NO    Nausea: [ ] YES [X ] NO           Vomiting: [ ] YES [ X] NO  Pain (0-10): 2             Pain Control Adequate: [ ] YES [X ] NO    OBJECTIVE:  PHYSICAL EXAM:  Gen: AAOx3, NAD  HEENT: NC/AT  RESP: Non labored breathing  CHEST/ABDOMEN: Soft, NT, ND. Drains in place, incisions C/D/I.    Vital Signs Last 24 Hrs  T(C): 36.1 (17 Jan 2020 20:00), Max: 37 (17 Jan 2020 06:33)  T(F): 97 (17 Jan 2020 20:00), Max: 98.6 (17 Jan 2020 06:33)  HR: 67 (17 Jan 2020 21:00) (64 - 81)  BP: 101/55 (17 Jan 2020 21:00) (98/57 - 128/76)  BP(mean): 71 (17 Jan 2020 21:00) (71 - 81)  RR: 16 (17 Jan 2020 21:00) (14 - 16)  SpO2: 99% (17 Jan 2020 21:00) (95% - 100%)      A/P: 39y Female 4 hours s/p Breast reconstruction with CASSIE free flap recovering well.  - Overnight in PACU.   - Care per PRS team.

## 2020-01-17 NOTE — BRIEF OPERATIVE NOTE - SPECIMENS
bilateral internal mammary lymph nodes, portion of bilateral 3rd rib cartilage
right breast, left breast, left sentinel lymph nodes

## 2020-01-17 NOTE — BRIEF OPERATIVE NOTE - OPERATION/FINDINGS
see op note
Bilateral skin sparing mastectomy. Left sentinel lymph node dissection using Technetium and isofuran blue dye. 2 lymph nodes weer negative for metastatic cancer intraoperatively by pathology.   CASSIE flaps per plastic surgery.

## 2020-01-18 LAB
ANION GAP SERPL CALC-SCNC: 10 MMOL/L — SIGNIFICANT CHANGE UP (ref 5–17)
BUN SERPL-MCNC: 10 MG/DL — SIGNIFICANT CHANGE UP (ref 7–23)
CALCIUM SERPL-MCNC: 7.1 MG/DL — LOW (ref 8.4–10.5)
CHLORIDE SERPL-SCNC: 106 MMOL/L — SIGNIFICANT CHANGE UP (ref 96–108)
CO2 SERPL-SCNC: 22 MMOL/L — SIGNIFICANT CHANGE UP (ref 22–31)
CREAT SERPL-MCNC: 0.59 MG/DL — SIGNIFICANT CHANGE UP (ref 0.5–1.3)
GLUCOSE SERPL-MCNC: 141 MG/DL — HIGH (ref 70–99)
HCT VFR BLD CALC: 24.3 % — LOW (ref 34.5–45)
HGB BLD-MCNC: 7.9 G/DL — LOW (ref 11.5–15.5)
MCHC RBC-ENTMCNC: 28.3 PG — SIGNIFICANT CHANGE UP (ref 27–34)
MCHC RBC-ENTMCNC: 32.5 GM/DL — SIGNIFICANT CHANGE UP (ref 32–36)
MCV RBC AUTO: 87.1 FL — SIGNIFICANT CHANGE UP (ref 80–100)
NRBC # BLD: 0 /100 WBCS — SIGNIFICANT CHANGE UP (ref 0–0)
PLATELET # BLD AUTO: 146 K/UL — LOW (ref 150–400)
POTASSIUM SERPL-MCNC: 3.9 MMOL/L — SIGNIFICANT CHANGE UP (ref 3.5–5.3)
POTASSIUM SERPL-SCNC: 3.9 MMOL/L — SIGNIFICANT CHANGE UP (ref 3.5–5.3)
RBC # BLD: 2.79 M/UL — LOW (ref 3.8–5.2)
RBC # FLD: 13.9 % — SIGNIFICANT CHANGE UP (ref 10.3–14.5)
SODIUM SERPL-SCNC: 138 MMOL/L — SIGNIFICANT CHANGE UP (ref 135–145)
WBC # BLD: 16.15 K/UL — HIGH (ref 3.8–10.5)
WBC # FLD AUTO: 16.15 K/UL — HIGH (ref 3.8–10.5)

## 2020-01-18 RX ADMIN — Medication 1000 MILLIGRAM(S): at 09:54

## 2020-01-18 RX ADMIN — HYDROMORPHONE HYDROCHLORIDE 1 MILLIGRAM(S): 2 INJECTION INTRAMUSCULAR; INTRAVENOUS; SUBCUTANEOUS at 07:20

## 2020-01-18 RX ADMIN — HYDROMORPHONE HYDROCHLORIDE 1 MILLIGRAM(S): 2 INJECTION INTRAMUSCULAR; INTRAVENOUS; SUBCUTANEOUS at 03:20

## 2020-01-18 RX ADMIN — HYDROMORPHONE HYDROCHLORIDE 1 MILLIGRAM(S): 2 INJECTION INTRAMUSCULAR; INTRAVENOUS; SUBCUTANEOUS at 19:37

## 2020-01-18 RX ADMIN — HYDROMORPHONE HYDROCHLORIDE 1 MILLIGRAM(S): 2 INJECTION INTRAMUSCULAR; INTRAVENOUS; SUBCUTANEOUS at 11:00

## 2020-01-18 RX ADMIN — HYDROMORPHONE HYDROCHLORIDE 1 MILLIGRAM(S): 2 INJECTION INTRAMUSCULAR; INTRAVENOUS; SUBCUTANEOUS at 11:08

## 2020-01-18 RX ADMIN — OXYCODONE HYDROCHLORIDE 5 MILLIGRAM(S): 5 TABLET ORAL at 21:27

## 2020-01-18 RX ADMIN — Medication 400 MILLIGRAM(S): at 09:16

## 2020-01-18 RX ADMIN — Medication 975 MILLIGRAM(S): at 21:35

## 2020-01-18 RX ADMIN — Medication 975 MILLIGRAM(S): at 14:37

## 2020-01-18 RX ADMIN — Medication 975 MILLIGRAM(S): at 05:42

## 2020-01-18 RX ADMIN — Medication 81 MILLIGRAM(S): at 14:36

## 2020-01-18 RX ADMIN — HYDROMORPHONE HYDROCHLORIDE 1 MILLIGRAM(S): 2 INJECTION INTRAMUSCULAR; INTRAVENOUS; SUBCUTANEOUS at 19:07

## 2020-01-18 RX ADMIN — HYDROMORPHONE HYDROCHLORIDE 1 MILLIGRAM(S): 2 INJECTION INTRAMUSCULAR; INTRAVENOUS; SUBCUTANEOUS at 15:20

## 2020-01-18 RX ADMIN — Medication 975 MILLIGRAM(S): at 20:58

## 2020-01-18 RX ADMIN — HYDROMORPHONE HYDROCHLORIDE 1 MILLIGRAM(S): 2 INJECTION INTRAMUSCULAR; INTRAVENOUS; SUBCUTANEOUS at 15:04

## 2020-01-18 RX ADMIN — HYDROMORPHONE HYDROCHLORIDE 1 MILLIGRAM(S): 2 INJECTION INTRAMUSCULAR; INTRAVENOUS; SUBCUTANEOUS at 03:35

## 2020-01-18 RX ADMIN — HYDROMORPHONE HYDROCHLORIDE 1 MILLIGRAM(S): 2 INJECTION INTRAMUSCULAR; INTRAVENOUS; SUBCUTANEOUS at 07:35

## 2020-01-18 RX ADMIN — Medication 975 MILLIGRAM(S): at 05:12

## 2020-01-18 RX ADMIN — HEPARIN SODIUM 5000 UNIT(S): 5000 INJECTION INTRAVENOUS; SUBCUTANEOUS at 05:13

## 2020-01-18 RX ADMIN — Medication 400 MILLIGRAM(S): at 00:07

## 2020-01-18 RX ADMIN — OXYCODONE HYDROCHLORIDE 5 MILLIGRAM(S): 5 TABLET ORAL at 20:57

## 2020-01-18 RX ADMIN — HEPARIN SODIUM 5000 UNIT(S): 5000 INJECTION INTRAVENOUS; SUBCUTANEOUS at 17:26

## 2020-01-18 RX ADMIN — Medication 100 MILLIGRAM(S): at 05:12

## 2020-01-18 RX ADMIN — Medication 975 MILLIGRAM(S): at 14:48

## 2020-01-18 RX ADMIN — Medication 1000 MILLIGRAM(S): at 00:36

## 2020-01-18 NOTE — PROGRESS NOTE ADULT - SUBJECTIVE AND OBJECTIVE BOX
38yo female s/p bilat mastectomies with right side SLNBx followed by immediate reconstruction with bilat CASSIE flaps  Patient tolerated the procedure well, POD# 1  Overnight received one unit of pRBC for postop Hgb of 7.6  This AM, she feels better, pain is well controlled, resting comfortably in bed  Flaps are viable pink with good cap refill and stable Vioptex signal  No evidence of hematoma or fluid collection  DIANNE drains are in place with serosang output   Brooks cath in place with good UOP    ICU Vital Signs Last 24 Hrs  T(C): 36.3 (18 Jan 2020 02:00), Max: 36.5 (17 Jan 2020 22:00)  T(F): 97.3 (18 Jan 2020 02:00), Max: 97.7 (17 Jan 2020 22:00)  HR: 70 (18 Jan 2020 05:00) (67 - 81)  BP: 96/51 (18 Jan 2020 05:00) (93/50 - 110/65)  BP(mean): 66 (18 Jan 2020 05:00) (65 - 81)  ABP: 100/55 (18 Jan 2020 05:00) (88/46 - 115/63)  ABP(mean): 70 (18 Jan 2020 05:00) (62 - 83)  RR: 14 (18 Jan 2020 04:00) (14 - 16)  SpO2: 100% (18 Jan 2020 05:00) (95% - 100%)    I&O's Detail    17 Jan 2020 07:01  -  18 Jan 2020 06:56  --------------------------------------------------------  IN:    lactated ringers.: 1375 mL    Packed Red Blood Cells: 300 mL  Total IN: 1675 mL    OUT:    Bulb: 60 mL    Bulb: 30 mL    Bulb: 40 mL    Bulb: 80 mL    Bulb: 45 mL    Bulb: 50 mL    Indwelling Catheter - Urethral: 1325 mL  Total OUT: 1630 mL    Total NET: 45 mL                            7.9    16.15 )-----------( 146      ( 18 Jan 2020 03:08 )             24.3   01-18    138  |  106  |  10  ----------------------------<  141<H>  3.9   |  22  |  0.59    Ca    7.1<L>      18 Jan 2020 03:08

## 2020-01-18 NOTE — PROGRESS NOTE ADULT - ASSESSMENT
40yo female s/p immediate bilat CASSIE flap reconstruction  - Transfer to floor, start diet and PO pain meds  - Remove feliz, up to chair and possibly ambulate at night  - Continue Vioptex   - Continue drain care   Will discuss with Dr. Parra

## 2020-01-18 NOTE — PROGRESS NOTE ADULT - SUBJECTIVE AND OBJECTIVE BOX
VSS  flaps pink  vioptix 70s% bilaterally  no collections  abd intact  JPs serosang  hct 23 to 24 with 1 prbc  Plan  Transfuse 2nd unit PRBC  CBC in AM tomorrow  transfer to floor  OOB to chair  reg diet

## 2020-01-18 NOTE — PROGRESS NOTE ADULT - SUBJECTIVE AND OBJECTIVE BOX
BLUE TEAM SURGERY PROGRESS NOTE    39yFemale    Interval events: s/p b/l mastectomies with left SLNBx and reconstruction with b/l CASSIE flaps  1 u pRBCs for postop H 7.6 with appropriate response    SUBJECTIVE:  Patient seen and examined at bedside. No acute events overnight. Pain is well controlled. Tolerating sips. Denies fevers, chills, nausea, vomiting, chest pain, and shortness of breath.     --------------------------------------------------------------------------------------------------  OBJECTIVE:     Physical Exam:  General: AAOx3, NAD, resting comfortably   HEENT: NC/AT  Respiratory: no increased work of breathing  Chest: b/l flaps viable with good cap refill adn stable vioptex signal, no erythema or hematoma. DIANNE drains with serosanguinous output   Brooks catheter in place with good output    Abdomen: soft, appropriate periincisional tenderness, nondistended, incision c/d/i  Extremities: warm and well perfused  --------------------------------------------------------------------------------------------------  Vital Signs:  Vital Signs Last 24 Hrs  T(C): 36.3 (18 Jan 2020 02:00), Max: 36.5 (17 Jan 2020 22:00)  T(F): 97.3 (18 Jan 2020 02:00), Max: 97.7 (17 Jan 2020 22:00)  HR: 70 (18 Jan 2020 05:00) (67 - 81)  BP: 96/51 (18 Jan 2020 05:00) (93/50 - 110/65)  BP(mean): 66 (18 Jan 2020 05:00) (65 - 81)  RR: 14 (18 Jan 2020 04:00) (14 - 16)  SpO2: 100% (18 Jan 2020 05:00) (95% - 100%)    --------------------------------------------------------------------------------------------------  Inputs/Outputs:    17 Jan 2020 07:01  -  18 Jan 2020 07:00  --------------------------------------------------------  IN:    lactated ringers.: 1375 mL    Packed Red Blood Cells: 300 mL  Total IN: 1675 mL    OUT:    Bulb: 60 mL    Bulb: 30 mL    Bulb: 40 mL    Bulb: 80 mL    Bulb: 45 mL    Bulb: 50 mL    Indwelling Catheter - Urethral: 1325 mL  Total OUT: 1630 mL    Total NET: 45 mL    --------------------------------------------------------------------------------------------------  Laboratories:                        7.9    16.15 )-----------( 146      ( 18 Jan 2020 03:08 )             24.3       18 Jan 2020 03:08    138    |  106    |  10     ----------------------------<  141    3.9     |  22     |  0.59     Ca    7.1        18 Jan 2020 03:08    --------------------------------------------------------------------------------------------------  Medications:  MEDICATIONS  (STANDING):  acetaminophen   Tablet .. 975 milliGRAM(s) Oral every 8 hours  acetaminophen  IVPB .. 1000 milliGRAM(s) IV Intermittent once  aspirin enteric coated 81 milliGRAM(s) Oral daily  ceFAZolin   IVPB 2000 milliGRAM(s) IV Intermittent once  heparin  Injectable 5000 Unit(s) SubCutaneous every 12 hours  lactated ringers. 1000 milliLiter(s) (125 mL/Hr) IV Continuous <Continuous>    MEDICATIONS  (PRN):  benzocaine 15 mG/menthol 3.6 mG (Sugar-Free) Lozenge 1 Lozenge Oral every 4 hours PRN Sore Throat  calcium carbonate    500 mG (Tums) Chewable 1 Tablet(s) Chew every 6 hours PRN Gas  diphenhydrAMINE 25 milliGRAM(s) Oral every 6 hours PRN Rash and/or Itching  HYDROmorphone  Injectable 1 milliGRAM(s) IV Push every 4 hours PRN Moderate Pain (4 - 6)  HYDROmorphone  Injectable 0.5 milliGRAM(s) IV Push every 10 minutes PRN Moderate Pain (4 - 6)  metoclopramide Injectable 10 milliGRAM(s) IV Push once PRN for nausea after giving Zofran  ondansetron Injectable 4 milliGRAM(s) IV Push every 6 hours PRN Nausea  oxyCODONE    IR 5 milliGRAM(s) Oral every 4 hours PRN Mild Pain (1 - 3)  senna 2 Tablet(s) Oral at bedtime PRN Constipation

## 2020-01-18 NOTE — PROGRESS NOTE ADULT - ASSESSMENT
ASSESSMENT:   39F s/p b/l mastectomies, left SLNBx and b/l CASSIE flap reconstruction (1/18), recovering well.     PLAN:   - Advance diet as tolerated  - Pain control PRN with PO pain meds   - Discontinue feliz catheter, f/u TOV  - Continue Vioptex per plastic surgery   - Continue drain care per plastic surgery   - DVT ppx: heparin sq    [facility]   [id number]

## 2020-01-19 ENCOUNTER — TRANSCRIPTION ENCOUNTER (OUTPATIENT)
Age: 40
End: 2020-01-19

## 2020-01-19 LAB
HCT VFR BLD CALC: 29.4 % — LOW (ref 34.5–45)
HGB BLD-MCNC: 9.3 G/DL — LOW (ref 11.5–15.5)
MCHC RBC-ENTMCNC: 28.5 PG — SIGNIFICANT CHANGE UP (ref 27–34)
MCHC RBC-ENTMCNC: 31.6 GM/DL — LOW (ref 32–36)
MCV RBC AUTO: 90.2 FL — SIGNIFICANT CHANGE UP (ref 80–100)
NRBC # BLD: 0 /100 WBCS — SIGNIFICANT CHANGE UP (ref 0–0)
PLATELET # BLD AUTO: 162 K/UL — SIGNIFICANT CHANGE UP (ref 150–400)
RBC # BLD: 3.26 M/UL — LOW (ref 3.8–5.2)
RBC # FLD: 14.1 % — SIGNIFICANT CHANGE UP (ref 10.3–14.5)
WBC # BLD: 14.81 K/UL — HIGH (ref 3.8–10.5)
WBC # FLD AUTO: 14.81 K/UL — HIGH (ref 3.8–10.5)

## 2020-01-19 RX ORDER — ACETAMINOPHEN 500 MG
3 TABLET ORAL
Qty: 0 | Refills: 0 | DISCHARGE
Start: 2020-01-19

## 2020-01-19 RX ORDER — OXYCODONE HYDROCHLORIDE 5 MG/1
1 TABLET ORAL
Qty: 10 | Refills: 0
Start: 2020-01-19

## 2020-01-19 RX ORDER — OXYCODONE HYDROCHLORIDE 5 MG/1
5 TABLET ORAL ONCE
Refills: 0 | Status: DISCONTINUED | OUTPATIENT
Start: 2020-01-19 | End: 2020-01-19

## 2020-01-19 RX ORDER — OXYCODONE HYDROCHLORIDE 5 MG/1
10 TABLET ORAL EVERY 4 HOURS
Refills: 0 | Status: DISCONTINUED | OUTPATIENT
Start: 2020-01-19 | End: 2020-01-20

## 2020-01-19 RX ORDER — OXYCODONE HYDROCHLORIDE 5 MG/1
5 TABLET ORAL EVERY 4 HOURS
Refills: 0 | Status: DISCONTINUED | OUTPATIENT
Start: 2020-01-19 | End: 2020-01-20

## 2020-01-19 RX ADMIN — Medication 975 MILLIGRAM(S): at 14:56

## 2020-01-19 RX ADMIN — OXYCODONE HYDROCHLORIDE 5 MILLIGRAM(S): 5 TABLET ORAL at 05:07

## 2020-01-19 RX ADMIN — Medication 975 MILLIGRAM(S): at 21:39

## 2020-01-19 RX ADMIN — OXYCODONE HYDROCHLORIDE 5 MILLIGRAM(S): 5 TABLET ORAL at 18:03

## 2020-01-19 RX ADMIN — OXYCODONE HYDROCHLORIDE 5 MILLIGRAM(S): 5 TABLET ORAL at 13:07

## 2020-01-19 RX ADMIN — HEPARIN SODIUM 5000 UNIT(S): 5000 INJECTION INTRAVENOUS; SUBCUTANEOUS at 05:40

## 2020-01-19 RX ADMIN — OXYCODONE HYDROCHLORIDE 5 MILLIGRAM(S): 5 TABLET ORAL at 09:04

## 2020-01-19 RX ADMIN — OXYCODONE HYDROCHLORIDE 5 MILLIGRAM(S): 5 TABLET ORAL at 08:34

## 2020-01-19 RX ADMIN — OXYCODONE HYDROCHLORIDE 5 MILLIGRAM(S): 5 TABLET ORAL at 16:37

## 2020-01-19 RX ADMIN — OXYCODONE HYDROCHLORIDE 10 MILLIGRAM(S): 5 TABLET ORAL at 22:24

## 2020-01-19 RX ADMIN — OXYCODONE HYDROCHLORIDE 5 MILLIGRAM(S): 5 TABLET ORAL at 18:33

## 2020-01-19 RX ADMIN — OXYCODONE HYDROCHLORIDE 5 MILLIGRAM(S): 5 TABLET ORAL at 04:36

## 2020-01-19 RX ADMIN — Medication 81 MILLIGRAM(S): at 12:36

## 2020-01-19 RX ADMIN — OXYCODONE HYDROCHLORIDE 5 MILLIGRAM(S): 5 TABLET ORAL at 12:37

## 2020-01-19 RX ADMIN — Medication 975 MILLIGRAM(S): at 06:10

## 2020-01-19 RX ADMIN — HEPARIN SODIUM 5000 UNIT(S): 5000 INJECTION INTRAVENOUS; SUBCUTANEOUS at 16:39

## 2020-01-19 RX ADMIN — OXYCODONE HYDROCHLORIDE 5 MILLIGRAM(S): 5 TABLET ORAL at 17:07

## 2020-01-19 RX ADMIN — Medication 975 MILLIGRAM(S): at 21:09

## 2020-01-19 RX ADMIN — OXYCODONE HYDROCHLORIDE 5 MILLIGRAM(S): 5 TABLET ORAL at 01:10

## 2020-01-19 RX ADMIN — OXYCODONE HYDROCHLORIDE 5 MILLIGRAM(S): 5 TABLET ORAL at 00:40

## 2020-01-19 RX ADMIN — OXYCODONE HYDROCHLORIDE 10 MILLIGRAM(S): 5 TABLET ORAL at 22:54

## 2020-01-19 RX ADMIN — Medication 975 MILLIGRAM(S): at 05:40

## 2020-01-19 NOTE — PROGRESS NOTE ADULT - SUBJECTIVE AND OBJECTIVE BOX
Plastic Surgery Progress Note  (pg [facility]: [id number], NS: [phone])    Subjective:  The patient was seen and examined on morning rounds.  denies pain  wants to go home 1/20    Objective:    Physical Exam:  Gen: appears well, NAD  Resp: breathing comfortably  Chest/Abd: Flaps are viable pink with good cap refill and stable Vioptex signal  No evidence of hematoma or fluid collection  Abd incision c/d/i no erythema, fluctuance, or purulent drainage  DIANNE drains are in place with serosang output     T(C): 37.1 (01-19-20 @ 05:40), Max: 37.5 (01-19-20 @ 01:38)  HR: 74 (01-19-20 @ 05:40) (67 - 92)  BP: 101/65 (01-19-20 @ 05:40) (90/55 - 132/74)  RR: 18 (01-19-20 @ 05:40) (16 - 18)  SpO2: 98% (01-19-20 @ 05:40) (96% - 100%)    01-18-20 @ 07:01  -  01-19-20 @ 07:00  --------------------------------------------------------  IN: 1975 mL / OUT: 2290 mL / NET: -315 mL          LABS                        7.9    16.15 )-----------( 146      ( 18 Jan 2020 03:08 )             24.3     01-18    138  |  106  |  10  ----------------------------<  141<H>  3.9   |  22  |  0.59    Ca    7.1<L>      18 Jan 2020 03:08

## 2020-01-19 NOTE — DISCHARGE NOTE PROVIDER - CARE PROVIDER_API CALL
Meredith Michaels)  Surgery  [address]  [place] [address]  Phone: [phone]  Fax: [phone]  Follow Up Time: 2 weeks    Husam Parra)  Plastic Surgery  [address], [address]  [place] [address]  Phone: [phone]  Fax: [phone]  Follow Up Time: 1 week

## 2020-01-19 NOTE — PROGRESS NOTE ADULT - ASSESSMENT
ASSESSMENT:   39F s/p b/l mastectomies, left SLNBx and b/l CASSIE flap reconstruction (1/18), recovering well.     PLAN:   - Advance diet as tolerated  - Pain control PRN with PO pain meds   - Continue Vioptex per plastic surgery   - Continue drain care per plastic surgery   - DVT ppx: heparin sq    [facility] Surgery   [id number]

## 2020-01-19 NOTE — DISCHARGE NOTE PROVIDER - NSDCFUADDINST_GEN_ALL_CORE_FT
Activity:  - Rest at home during the first few days after surgery.  - Walking is encouraged, but strenuous exercise is not allowed until 6 weeks after surgery.   - Avoid strenuous activity. Do not lift your arms above your head. Do not lift more than 5-10 pounds.    Sleep:  Sleep on your back for the first two weeks after surgery.    Showering:  - You may take sponge baths following discharge. Pat dry. We will instruct you to shower once you have drains removed from your breasts in the office.  - Do not take a bath or submerge yourself in water.  - You will have special adhesive glue or tape over the incisions. Do not take these off.    For the Breast:  You have just undergone a breast reconstruction with the CASSIE flap. Your breast will likely have bruising and possibly some blistering on the skin, which is expected after a mastectomy. You have a small patch of skin on your breasts, which is a different color than the surrounding breast skin. This paddle of skin comes from the abdomen and is an indicator of how the flap is doing. It is important to check this skin paddle daily. The skin should remain the same color. If the color of the small patch changes (i.e blue, purple, pale), please call the office immediately.    For the Abdomen:  Your incision and belly button are covered in a special medical grade sealant, which will come off in the office, 2-3 weeks after surgery.    Drains:  Both the breast and abdomen will have drains. It is important to empty the drains twice daily and record the outputs. Please bring this sheet to your appointment after surgery. Based on the output, the drains will be removed 1 to 3 weeks after surgery. For the drains to be working appropriately, the bulbs need to be collapsed to create a light suction. The nurse in the hospital will review the drain care with you and your family prior to discharge home. It is best to safely secure the drains to your clothes with a safety pin.    In an effort to make you more comfortable with discharge home and to answer any questions you may have, these instructions are for you. However, you may call the office at at any time with additional questions or concerns.    Call the Office:  Do not hesitate to call the office with any concerns or questions. A doctor is available to answer your questions 24 hours a day.   Please notify us if:  1. You have increased swelling, pain, or color change in the breast.  2. One breast becomes suddenly significantly larger than the other breast.  3. You have a sudden increase in swelling of the abdomen.  4. You have redness develop around the incisions.  5. You have a fever greater than 101 F.  6. You develop sudden increase in pain.  7. You develop drainage, spreading redness or foul odor  8. You have any questions.    Follow up with Dr. Parra in one week. Follow up with Dr. Michaels in 2 weeks. Activity:  - Rest at home during the first few days after surgery.  - Walking is encouraged, but strenuous exercise is not allowed until 6 weeks after surgery.   - Avoid strenuous activity. Do not lift your arms above your head. Do not lift more than 5-10 pounds.    Sleep:  Sleep on your back for the first two weeks after surgery.    Showering:  - You may take sponge baths following discharge. Pat dry. We will instruct you to shower once you have drains removed from your breasts in the office.  - Do not take a bath or submerge yourself in water.  - You will have special adhesive glue or tape over the incisions. Do not take these off.    For the Breast:  You have just undergone a breast reconstruction with the CASSIE flap. Your breast will likely have bruising and possibly some blistering on the skin, which is expected after a mastectomy. You have a small patch of skin on your breasts, which is a different color than the surrounding breast skin. This paddle of skin comes from the abdomen and is an indicator of how the flap is doing. It is important to check this skin paddle daily. The skin should remain the same color. If the color of the small patch changes (i.e blue, purple, pale), please call the office immediately.    For the Abdomen:  Your incision and belly button are covered in a special medical grade sealant, which will come off in the office, 2-3 weeks after surgery.    Drains:  Both the breast and abdomen will have drains. It is important to empty the drains twice daily and record the outputs. Please bring this sheet to your appointment after surgery. Based on the output, the drains will be removed 1 to 3 weeks after surgery. For the drains to be working appropriately, the bulbs need to be collapsed to create a light suction. The nurse in the hospital will review the drain care with you and your family prior to discharge home. It is best to safely secure the drains to your clothes with a safety pin.    In an effort to make you more comfortable with discharge home and to answer any questions you may have, these instructions are for you. However, you may call the office at at any time with additional questions or concerns.    Call the Office:  Do not hesitate to call the office with any concerns or questions. A doctor is available to answer your questions 24 hours a day.   Please notify us if:  1. You have increased swelling, pain, or color change in the breast.  2. One breast becomes suddenly significantly larger than the other breast.  3. You have a sudden increase in swelling of the abdomen.  4. You have redness develop around the incisions.  5. You have a fever greater than 101 F.  6. You develop sudden increase in pain.  7. You develop drainage, spreading redness or foul odor  8. You have any questions.    Follow up with Dr. Parra on Friday 1/24. Follow up with Dr. Michaels in 2 weeks.

## 2020-01-19 NOTE — PROGRESS NOTE ADULT - ASSESSMENT
40yo female s/p bilateral skin sparing mastectomy, L SLNBx, bilat CASSIE flap reconstruction  - reg diet  - PO pain control  - f/u AM CBC  - monitor vitals, i/o's  - Continue Vioptex, flap checks q4h  - Continue drain care     PLASTIC SURGERY  (pg [facility]: [id number], NS: [phone])

## 2020-01-19 NOTE — DISCHARGE NOTE PROVIDER - NSDCCPTREATMENT_GEN_ALL_CORE_FT
PRINCIPAL PROCEDURE  Procedure: Breast reconstruction with CASSIE free flap  Findings and Treatment:       SECONDARY PROCEDURE  Procedure: Mastectomy, bilateral, skin sparing  Findings and Treatment:

## 2020-01-19 NOTE — PROGRESS NOTE ADULT - SUBJECTIVE AND OBJECTIVE BOX
Surgery Progress Note    S: Patient seen and examined. No acute events overnight. Reports pain in the right axilla more than the left. No limitations on the range of motion. Reports tolerating diet without nausea, vomiting, passing flatus, having bowel movements, voiding without issues, have been out of bed to chair. Denies fever, chills, SOB, chest pain.     O:  Physical Exam:  Gen: Laying in bed, NAD  HEENT: atrumatic, EMOI  Resp: Unlabored breathing  Breast: Drains serosang fluids  Ext: Moves 4 extremities spontaneously    Vital Signs Last 24 Hrs  T(C): 37.1 (19 Jan 2020 05:40), Max: 37.5 (19 Jan 2020 01:38)  T(F): 98.8 (19 Jan 2020 05:40), Max: 99.5 (19 Jan 2020 01:38)  HR: 74 (19 Jan 2020 05:40) (67 - 92)  BP: 101/65 (19 Jan 2020 05:40) (90/55 - 132/74)  BP(mean): 67 (18 Jan 2020 10:00) (67 - 69)  RR: 18 (19 Jan 2020 05:40) (16 - 18)  SpO2: 98% (19 Jan 2020 05:40) (96% - 100%)    I&O's Detail    18 Jan 2020 07:01  -  19 Jan 2020 07:00  --------------------------------------------------------  IN:    lactated ringers.: 1975 mL  Total IN: 1975 mL    OUT:    Bulb: 95 mL    Bulb: 130 mL    Bulb: 85 mL    Bulb: 75 mL    Bulb: 120 mL    Bulb: 25 mL    Indwelling Catheter - Urethral: 710 mL    Voided: 1050 mL  Total OUT: 2290 mL    Total NET: -315 mL                                7.9    16.15 )-----------( 146      ( 18 Jan 2020 03:08 )             24.3       01-18    138  |  106  |  10  ----------------------------<  141<H>  3.9   |  22  |  0.59    Ca    7.1<L>      18 Jan 2020 03:08

## 2020-01-19 NOTE — DISCHARGE NOTE PROVIDER - NSDCMRMEDTOKEN_GEN_ALL_CORE_FT
acetaminophen 325 mg oral tablet: 3 tab(s) orally every 8 hours  oxyCODONE 5 mg oral tablet: 1 tab(s) orally every 4 hours, As needed, Severe Pain (7 - 10) MDD:6 acetaminophen 325 mg oral tablet: 3 tab(s) orally every 8 hours

## 2020-01-19 NOTE — DISCHARGE NOTE PROVIDER - NSDCCPCAREPLAN_GEN_ALL_CORE_FT
PRINCIPAL DISCHARGE DIAGNOSIS  Diagnosis: Breast cancer  Assessment and Plan of Treatment:       SECONDARY DISCHARGE DIAGNOSES  Diagnosis: Need for prophylactic measure  Assessment and Plan of Treatment:

## 2020-01-19 NOTE — DISCHARGE NOTE PROVIDER - PROVIDER TOKENS
PROVIDER:[TOKEN:[[id number]],FOLLOWUP:[2 weeks]],PROVIDER:[TOKEN:[[id number]],FOLLOWUP:[1 week]]

## 2020-01-20 ENCOUNTER — TRANSCRIPTION ENCOUNTER (OUTPATIENT)
Age: 40
End: 2020-01-20

## 2020-01-20 VITALS
SYSTOLIC BLOOD PRESSURE: 108 MMHG | HEART RATE: 78 BPM | RESPIRATION RATE: 18 BRPM | TEMPERATURE: 99 F | OXYGEN SATURATION: 92 % | DIASTOLIC BLOOD PRESSURE: 71 MMHG

## 2020-01-20 PROCEDURE — 85027 COMPLETE CBC AUTOMATED: CPT

## 2020-01-20 PROCEDURE — 80048 BASIC METABOLIC PNL TOTAL CA: CPT

## 2020-01-20 PROCEDURE — 88307 TISSUE EXAM BY PATHOLOGIST: CPT

## 2020-01-20 PROCEDURE — 88300 SURGICAL PATH GROSS: CPT

## 2020-01-20 PROCEDURE — 88331 PATH CONSLTJ SURG 1 BLK 1SPC: CPT

## 2020-01-20 PROCEDURE — 81025 URINE PREGNANCY TEST: CPT

## 2020-01-20 PROCEDURE — C1889: CPT

## 2020-01-20 PROCEDURE — 88305 TISSUE EXAM BY PATHOLOGIST: CPT

## 2020-01-20 PROCEDURE — P9016: CPT

## 2020-01-20 PROCEDURE — 36430 TRANSFUSION BLD/BLD COMPNT: CPT

## 2020-01-20 PROCEDURE — 86923 COMPATIBILITY TEST ELECTRIC: CPT

## 2020-01-20 PROCEDURE — 88360 TUMOR IMMUNOHISTOCHEM/MANUAL: CPT

## 2020-01-20 PROCEDURE — C1781: CPT

## 2020-01-20 PROCEDURE — P9041: CPT

## 2020-01-20 PROCEDURE — A9541: CPT

## 2020-01-20 RX ADMIN — OXYCODONE HYDROCHLORIDE 10 MILLIGRAM(S): 5 TABLET ORAL at 07:10

## 2020-01-20 RX ADMIN — OXYCODONE HYDROCHLORIDE 10 MILLIGRAM(S): 5 TABLET ORAL at 14:50

## 2020-01-20 RX ADMIN — Medication 975 MILLIGRAM(S): at 05:51

## 2020-01-20 RX ADMIN — OXYCODONE HYDROCHLORIDE 10 MILLIGRAM(S): 5 TABLET ORAL at 06:40

## 2020-01-20 RX ADMIN — Medication 975 MILLIGRAM(S): at 05:21

## 2020-01-20 RX ADMIN — Medication 975 MILLIGRAM(S): at 13:02

## 2020-01-20 RX ADMIN — OXYCODONE HYDROCHLORIDE 5 MILLIGRAM(S): 5 TABLET ORAL at 03:00

## 2020-01-20 RX ADMIN — OXYCODONE HYDROCHLORIDE 5 MILLIGRAM(S): 5 TABLET ORAL at 02:32

## 2020-01-20 RX ADMIN — Medication 81 MILLIGRAM(S): at 11:22

## 2020-01-20 RX ADMIN — OXYCODONE HYDROCHLORIDE 10 MILLIGRAM(S): 5 TABLET ORAL at 11:18

## 2020-01-20 RX ADMIN — Medication 975 MILLIGRAM(S): at 12:32

## 2020-01-20 RX ADMIN — HEPARIN SODIUM 5000 UNIT(S): 5000 INJECTION INTRAVENOUS; SUBCUTANEOUS at 05:23

## 2020-01-20 RX ADMIN — OXYCODONE HYDROCHLORIDE 10 MILLIGRAM(S): 5 TABLET ORAL at 10:48

## 2020-01-20 NOTE — PROGRESS NOTE ADULT - SUBJECTIVE AND OBJECTIVE BOX
BLUE TEAM SURGERY PROGRESS NOTE    39yFemale    SUBJECTIVE:  Patient seen and examined at bedside. Pain is well controlled with meds. Ambulating, tolerating diet. Denies fevers, chills, nausea, vomiting, chest pain, and shortness of breath.     --------------------------------------------------------------------------------------------------  OBJECTIVE:   Physical Exam:  Gen: appears well, NAD  Resp: breathing comfortably  Chest/Abd: Flaps are soft, viable pink with good cap refill, no ecchymosis    No evidence of hematoma or fluid collection  Abd incision c/d/i no erythema, fluctuance, or purulent drainage  DIANNE drains are in place with serosang output     --------------------------------------------------------------------------------------------------  Vital Signs:  Vital Signs Last 24 Hrs  T(C): 37.7 (20 Jan 2020 05:26), Max: 37.7 (20 Jan 2020 05:26)  T(F): 99.8 (20 Jan 2020 05:26), Max: 99.8 (20 Jan 2020 05:26)  HR: 90 (20 Jan 2020 05:26) (75 - 90)  BP: 124/76 (20 Jan 2020 05:26) (100/68 - 124/76)  BP(mean): --  RR: 18 (20 Jan 2020 05:26) (18 - 18)  SpO2: 91% (20 Jan 2020 05:26) (90% - 96%)    --------------------------------------------------------------------------------------------------  Inputs/Outputs:    18 Jan 2020 07:01  -  19 Jan 2020 07:00  --------------------------------------------------------  IN:    lactated ringers.: 1975 mL  Total IN: 1975 mL    OUT:    Bulb: 95 mL    Bulb: 130 mL    Bulb: 85 mL    Bulb: 75 mL    Bulb: 120 mL    Bulb: 25 mL    Indwelling Catheter - Urethral: 710 mL    Voided: 1050 mL  Total OUT: 2290 mL    Total NET: -315 mL      19 Jan 2020 07:01  -  20 Jan 2020 06:53  --------------------------------------------------------  IN:    Oral Fluid: 990 mL  Total IN: 990 mL    OUT:    Bulb: 36 mL    Bulb: 63 mL    Bulb: 45 mL    Bulb: 43 mL    Bulb: 39 mL    Bulb: 90 mL    Voided: 1250 mL  Total OUT: 1566 mL    Total NET: -576 mL    --------------------------------------------------------------------------------------------------  Laboratories:                        9.3    14.81 )-----------( 162      ( 19 Jan 2020 11:53 )             29.4     --------------------------------------------------------------------------------------------------  Medications:  MEDICATIONS  (STANDING):  acetaminophen   Tablet .. 975 milliGRAM(s) Oral every 8 hours  aspirin enteric coated 81 milliGRAM(s) Oral daily  ceFAZolin   IVPB 2000 milliGRAM(s) IV Intermittent once  heparin  Injectable 5000 Unit(s) SubCutaneous every 12 hours    MEDICATIONS  (PRN):  benzocaine 15 mG/menthol 3.6 mG (Sugar-Free) Lozenge 1 Lozenge Oral every 4 hours PRN Sore Throat  calcium carbonate    500 mG (Tums) Chewable 1 Tablet(s) Chew every 6 hours PRN Gas  diphenhydrAMINE 25 milliGRAM(s) Oral every 6 hours PRN Rash and/or Itching  ondansetron Injectable 4 milliGRAM(s) IV Push every 6 hours PRN Nausea  oxyCODONE    IR 5 milliGRAM(s) Oral every 4 hours PRN Moderate Pain (4 - 6)  oxyCODONE    IR 10 milliGRAM(s) Oral every 4 hours PRN Severe Pain (7 - 10)  senna 2 Tablet(s) Oral at bedtime PRN Constipation

## 2020-01-20 NOTE — PROGRESS NOTE ADULT - ASSESSMENT
38yo female s/p bilateral skin sparing mastectomy, L SLNBx, bilat CASSIE flap reconstruction  - reg diet  - PO pain control  - d/c home with VNS and drain teaching    PLASTIC SURGERY  (pg [facility]: [id number], NS: [phone])

## 2020-01-20 NOTE — PROGRESS NOTE ADULT - ASSESSMENT
ASSESSMENT:   39F s/p b/l mastectomies, left SLNBx and b/l CASSIE flap reconstruction (1/18), recovering well.     PLAN:   - Regular diet  - Pain control PRN with PO pain meds   - Continue drain care  - DVT ppx: heparin sq  - Care per plastic surgery   - Outpt follow up with Dr. Michaels in 2 weeks    [facility]   [id number]

## 2020-01-20 NOTE — DISCHARGE NOTE NURSING/CASE MANAGEMENT/SOCIAL WORK - PATIENT PORTAL LINK FT
You can access the FollowMyHealth Patient Portal offered by [facility] by registering at the following website: [email or web address]. By joining [facility]’s FollowMyHealth portal, you will also be able to view your health information using other applications (apps) compatible with our system.

## 2020-01-20 NOTE — PROGRESS NOTE ADULT - SUBJECTIVE AND OBJECTIVE BOX
Patient doing well this morning. No complaints.    On exam, bilateral breasts are soft. Flaps warm and viable. Incisions cdi.  Abdomen soft and flat. Incision intact. Drains ss.    Plan  Dc home today  Ok to shower  Perform drain care as instructed  Follow up with Dr. Parra in the office on Friday

## 2020-01-23 LAB — SURGICAL PATHOLOGY STUDY: SIGNIFICANT CHANGE UP

## 2021-04-21 NOTE — DISCHARGE NOTE OB - NS OB DC TDAP REASON NOT RECEIVED
Bedside shift change report given to Abelino Coffman RN by Vale Moss RN. Report included the following information SBAR, ED Summary, MAR and Recent Results. Contraindicated

## 2021-08-19 NOTE — H&P PST ADULT - GASTROINTESTINAL DETAILS
 Active Member of Clubs or Organizations:     Attends Club or Organization Meetings:     Marital Status:    Intimate Partner Violence:     Fear of Current or Ex-Partner:     Emotionally Abused:     Physically Abused:     Sexually Abused:        Family HISTORY    No family history on file. PHYSICAL EXAM    Vital Signs:  Ht 5' 2\" (1.575 m)   Wt 161 lb (73 kg)   BMI 29.45 kg/m²   General Appearance:  Normal body habitus. Alert and oriented to person, place, and time. Affect:  Normal.   Gait:  Normal. Good balance and coordination. Skin:  Intact. Sensation:  Intact. Strength:  Intact. Reflexes:  Intact. Pulses:  Intact. Knee Exam:    Effusion: Trace    Range of Motion Right Left   Extension 0 0   Flexion 110 110     Provocative Test Right Left    Positive Negative Positive Negative   Anterior drawer [] [] [] []   Lachman [] [] [] []   Posterior drawer [] [] [] []   Varus testing [] [x] [] [x]   Valgus testing [] [x] [x] []   Joint line tenderness [] [x] [x] []     Additional Exam Comments: Her neurocirculatory lymphatic exam otherwise is normal symmetric in both lower extremities. She does have medial compartment pain to direct palpation and valgus stress but also has pain over the proximal tibia anteromedially to palpation. IMAGING STUDIES    X-rays 3 views of left knee reveals grade 3+ osteoarthritis mostly medial compartment    IMPRESSION    Left knee pain secondary to osteoarthritis with possible stress reaction    PLAN      1. Conservative care options including physical therapy, NSAIDs, bracing, and activity modification were discussed. 2.  The indications for therapeutic injections were discussed. 3.  The indications for additional imaging studies were discussed.    4.  After considering the various options discussed, the patient elected to pursue a course that includes cortisone injection today and an MRI left knee to see if she in fact would have anything along the lines of
soft/no masses palpable/bowel sounds normal/no distention/nontender

## 2022-07-22 NOTE — PROGRESS NOTE ADULT - SUBJECTIVE AND OBJECTIVE BOX
Plastic Surgery Progress Note  (pg [facility]: [id number], NS: [phone])    Subjective:  The patient was seen and examined on morning rounds. Denies pain  wants to go home  has VNS set up    Objective:    Physical Exam:  Gen: appears well, NAD  Resp: breathing comfortably  Chest/Abd: Flaps are viable pink with good cap refill and stable Vioptex signal  No evidence of hematoma or fluid collection  Abd incision c/d/i no erythema, fluctuance, or purulent drainage  DIANNE drains are in place with serosang output     T(C): 37.7 (01-20-20 @ 05:26), Max: 37.7 (01-20-20 @ 05:26)  HR: 90 (01-20-20 @ 05:26) (82 - 90)  BP: 124/76 (01-20-20 @ 05:26) (100/68 - 124/76)  RR: 18 (01-20-20 @ 05:26) (18 - 18)  SpO2: 91% (01-20-20 @ 05:26) (91% - 96%)    01-19-20 @ 07:01  -  01-20-20 @ 07:00  --------------------------------------------------------  IN: 990 mL / OUT: 2566 mL / NET: -1576 mL          LABS                        9.3    14.81 )-----------( 162      ( 19 Jan 2020 11:53 )             29.4 .

## 2024-03-13 PROBLEM — C50.919 MALIGNANT NEOPLASM OF UNSPECIFIED SITE OF UNSPECIFIED FEMALE BREAST: Chronic | Status: ACTIVE | Noted: 2020-01-06

## 2024-03-13 PROBLEM — Z13.71 ENCOUNTER FOR NONPROCREATIVE SCREENING FOR GENETIC DISEASE CARRIER STATUS: Chronic | Status: ACTIVE | Noted: 2020-01-06

## 2024-03-15 ENCOUNTER — APPOINTMENT (OUTPATIENT)
Dept: ORTHOPEDIC SURGERY | Facility: CLINIC | Age: 44
End: 2024-03-15
Payer: COMMERCIAL

## 2024-03-15 DIAGNOSIS — C80.1 MALIGNANT (PRIMARY) NEOPLASM, UNSPECIFIED: ICD-10-CM

## 2024-03-15 DIAGNOSIS — Z00.00 ENCOUNTER FOR GENERAL ADULT MEDICAL EXAMINATION W/OUT ABNORMAL FINDINGS: ICD-10-CM

## 2024-03-15 PROCEDURE — 73590 X-RAY EXAM OF LOWER LEG: CPT | Mod: RT

## 2024-03-15 PROCEDURE — 72100 X-RAY EXAM L-S SPINE 2/3 VWS: CPT

## 2024-03-15 PROCEDURE — 73610 X-RAY EXAM OF ANKLE: CPT | Mod: RT

## 2024-03-15 PROCEDURE — 99204 OFFICE O/P NEW MOD 45 MIN: CPT

## 2024-03-15 NOTE — PHYSICAL EXAM
[NL (40)] : plantar flexion 40 degrees [NL (20)] : eversion 20 degrees [NL 30)] : inversion 30 degrees [5___] : inversion 5[unfilled]/5 [2+] : posterior tibialis pulse: 2+ [Normal] : saphenous nerve sensation normal [4___] : eversion 4[unfilled]/5 [Right] : right tibia/fibula [Weight -] : weightbearing [There are no fractures, subluxations or dislocations. No significant abnormalities are seen] : There are no fractures, subluxations or dislocations. No significant abnormalities are seen [Flexion] : flexion [Extension] : extension [FreeTextEntry1] : Mild narrowing L4/5 and L5/S1 [de-identified] : Equivocal straight leg raise on the right.  [] : no pain when stressing lateral tarsal metatarsal joint

## 2024-03-15 NOTE — DISCUSSION/SUMMARY
[de-identified] : MRI L/S to evaluate for HNP. Pt. to consult with Dr. Sprague after MRI.  Tylenol prn, stretching exercises.

## 2024-03-15 NOTE — HISTORY OF PRESENT ILLNESS
[8] : 8 [0] : 0 [Sharp] : sharp [Stabbing] : stabbing [Shooting] : shooting [Tightness] : tightness [de-identified] : 03/15/2024 JACQUELINE AVENDAÑO a 43 year old female here for evaluation of her right ankle/lower leg. Patient states she started to experience tightness along the calf 2/01/24. She then fell 2/18/24 and injured her lateral ankle. She was evaluated by her internist and provided medrol dose pack, patient reports hives and difficulty breathing while taking this. This has since resolved. No previous injury/problem with right ankle/lower leg. No numbness/tingling. Ice/elevation/massage to affected area. No formal treatment to date. WB in slippers. [] : no

## 2024-03-20 ENCOUNTER — APPOINTMENT (OUTPATIENT)
Dept: MRI IMAGING | Facility: CLINIC | Age: 44
End: 2024-03-20
Payer: COMMERCIAL

## 2024-03-20 PROCEDURE — 72148 MRI LUMBAR SPINE W/O DYE: CPT

## 2024-04-05 ENCOUNTER — APPOINTMENT (OUTPATIENT)
Dept: ORTHOPEDIC SURGERY | Facility: CLINIC | Age: 44
End: 2024-04-05
Payer: COMMERCIAL

## 2024-04-05 VITALS — BODY MASS INDEX: 31.47 KG/M2 | HEIGHT: 62 IN | WEIGHT: 171 LBS

## 2024-04-05 DIAGNOSIS — M54.16 RADICULOPATHY, LUMBAR REGION: ICD-10-CM

## 2024-04-05 PROCEDURE — 99205 OFFICE O/P NEW HI 60 MIN: CPT

## 2024-04-05 RX ORDER — GABAPENTIN 100 MG/1
100 CAPSULE ORAL
Qty: 60 | Refills: 2 | Status: ACTIVE | COMMUNITY
Start: 2024-04-05 | End: 1900-01-01

## 2024-04-05 NOTE — ASSESSMENT
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 Advised of need to taper on/off medication and risk of abruptly stopping gabapentin.   Severe LBP with R>L tension signs and radic Indicating for L4-5 laminectomy with discectomy  Discectomy- We've discussed the surgery details as well as potential for complications including but not limited to pain, scar, bleeding and infection. There is also a possibility for recurrent or residual disk herniation, failure or fracture of bone, and need for future surgery. Finally, we discussed potential for injury to nerves, the spinal cord either transient or permanent, damage to blood vessels, CSF leak, blindness, need for transfusion, and medical complications. The patient verbalized understanding and all questions were answered.

## 2024-04-05 NOTE — HISTORY OF PRESENT ILLNESS
[de-identified] : 3/20/24 Lumbar MRI  - report noted in chart.   Ind. 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 Patient denies numbness/tingling; pain travels into the right ankle. No bb dysfunction.  [] : no

## 2024-04-05 NOTE — IMAGING
[de-identified] : LSPINE Inspection: No rash or ecchymosis Palpation: No tenderness to palpation or spasm in bilateral thoracic and lumbar paraspinal musculature, no SI joint tenderness to palpation ROM: limited  Strength: 5/5 bilateral hip flexors, knee extensors, ankle dorsiflexors, EHL, ankle plantarflexors Sensation: Sensation present to light touch bilateral L2-S1 distributions. diminished dorsal foot on the right Provocative maneuvers: + R SLR positive left contralateral SLR [Straightening consistent with spasm] : Straightening consistent with spasm [Disc space narrowing] : Disc space narrowing

## 2024-10-07 NOTE — PROGRESS NOTE ADULT - PROVIDER SPECIALTY LIST ADULT
Anesthesia Follow up with the primary care provider in 3 - 4 days for a recheck.  Present immediately back to the urgent care clinic or emergency room if any worsening, additional concerns or new symptoms.     Use over-the-counter Flonase as directed on the package.

## 2024-11-05 NOTE — DISCHARGE NOTE PROVIDER - HOSPITAL COURSE
38 y/o female, , s/p Mirena placement in 2018, with newly diagnosed left breast CA found on routine mammo. Pt states she also noticed bloody discharge from her left nipple. Presents for bilateral mastectomy with CASSIE flap reconstruction.         s/p b/l mastectomies, left SLNBx and b/l CASSIE flap reconstruction ()    Patient tolerated procedure well, recovered in PACU and was transferred to the floor.        On the floor, flap checks were continued and there were no acute events.        At time of discharge, pt was tolerating a regular diet, voiding/stooling spontaneously, ambulating, and pain was well-controlled. Patient and family felt ready for discharge.
2 seconds or less